# Patient Record
Sex: FEMALE | Race: WHITE | NOT HISPANIC OR LATINO
[De-identification: names, ages, dates, MRNs, and addresses within clinical notes are randomized per-mention and may not be internally consistent; named-entity substitution may affect disease eponyms.]

---

## 2021-09-16 PROBLEM — Z00.00 ENCOUNTER FOR PREVENTIVE HEALTH EXAMINATION: Status: ACTIVE | Noted: 2021-09-16

## 2021-10-15 ENCOUNTER — APPOINTMENT (OUTPATIENT)
Dept: NEUROSURGERY | Facility: CLINIC | Age: 56
End: 2021-10-15

## 2021-11-18 ENCOUNTER — APPOINTMENT (OUTPATIENT)
Dept: NEUROSURGERY | Facility: CLINIC | Age: 56
End: 2021-11-18
Payer: COMMERCIAL

## 2021-11-18 VITALS
TEMPERATURE: 98.1 F | HEART RATE: 66 BPM | OXYGEN SATURATION: 99 % | BODY MASS INDEX: 34.36 KG/M2 | WEIGHT: 182 LBS | HEIGHT: 61 IN | SYSTOLIC BLOOD PRESSURE: 133 MMHG | DIASTOLIC BLOOD PRESSURE: 70 MMHG

## 2021-11-18 DIAGNOSIS — M54.2 CERVICALGIA: ICD-10-CM

## 2021-11-18 PROCEDURE — 99203 OFFICE O/P NEW LOW 30 MIN: CPT

## 2021-11-19 PROBLEM — M54.2 PAIN IN NECK: Status: ACTIVE | Noted: 2021-11-19

## 2021-11-19 NOTE — PHYSICAL EXAM
[Spurling's - Opposite Side] : Positive Spurling's on opposite side [Spurling's Same Side] : Positive Spurling's on same side [Straight-Leg Raise Test - Left] : straight leg raise of the left leg was positive [Straight-Leg Raise Test - Right] : straight leg raise of the right leg was positive [4] : 4/5 Great Toe Extension (L5) [No Deficits] : no sensory deficits [5] : 5/5 Ankle Plantar Flexion (S1)

## 2021-11-19 NOTE — HISTORY OF PRESENT ILLNESS
[> 3 months] : more  than 3 months [de-identified] : She comes in complaining of neck pain for about a year. The severity is about 5 out of 10. The pain fluctuates. She never has a pain free day. The pain radiates into the arms and  hands. She has numbness and tingling. She has balance problems. No dexterity problems. She has had PT with no help and has not done injections. \par Low back pain is 9/10. The pain goes down the legs right worse. The pain is constant, pulls and feels like pressure. SHe has had 3 spinal injections and PT with no help. She also has tingling, numbness and cramps.  She can only walk a block and a half. She can stand about 1/2 hour..

## 2021-11-19 NOTE — PLAN
[FreeTextEntry1] : Her lower low back pain is worse. I will have her do epidural injections before discussing surgery. I will also have her do Xrays with flexion extension views.

## 2021-12-30 ENCOUNTER — TRANSCRIPTION ENCOUNTER (OUTPATIENT)
Age: 56
End: 2021-12-30

## 2022-04-02 ENCOUNTER — TRANSCRIPTION ENCOUNTER (OUTPATIENT)
Age: 57
End: 2022-04-02

## 2022-11-21 ENCOUNTER — NON-APPOINTMENT (OUTPATIENT)
Age: 57
End: 2022-11-21

## 2022-11-21 ENCOUNTER — APPOINTMENT (OUTPATIENT)
Dept: NEUROSURGERY | Facility: CLINIC | Age: 57
End: 2022-11-21

## 2022-11-21 VITALS
BODY MASS INDEX: 33.99 KG/M2 | HEART RATE: 74 BPM | WEIGHT: 180 LBS | RESPIRATION RATE: 17 BRPM | TEMPERATURE: 96.9 F | DIASTOLIC BLOOD PRESSURE: 90 MMHG | HEIGHT: 61 IN | SYSTOLIC BLOOD PRESSURE: 134 MMHG | OXYGEN SATURATION: 96 %

## 2022-11-21 PROCEDURE — 99215 OFFICE O/P EST HI 40 MIN: CPT

## 2022-11-23 NOTE — PHYSICAL EXAM
[General Appearance - Alert] : alert [General Appearance - In No Acute Distress] : in no acute distress [Oriented To Time, Place, And Person] : oriented to person, place, and time [Straight-Leg Raise Test - Right] : straight leg raise of the right leg was positive [Sclera] : the sclera and conjunctiva were normal [Outer Ear] : the ears and nose were normal in appearance [Neck Appearance] : the appearance of the neck was normal [] : no respiratory distress [Skin Color & Pigmentation] : normal skin color and pigmentation [FreeTextEntry6] : RLE 3/5 strength \par RUE 5/5\par LUE 5/5\par LLE 5/5

## 2022-11-23 NOTE — ASSESSMENT
[FreeTextEntry1] : MRI lumbar spine from 10/22/21 reviewed by Dr. Penny with patient demonstrates degenerative arthritis of lumbar spine with foraminal stenosis at L4-5.\par Will need updated imaging prior to finalizing treatment plan.\par Given failure of conservative measures, recommend MRI lumbar spine and flex/ext x-rays. After imaging complete, return to the office to reviewed imaging and finalize treatment plan.\par She was recommended TLSO back brace while ambulating - order provided.\par \par Patient verbalizes agreement and understanding with plan of care.\par \par I, Dr. Penny, personally performed the evaluation and management (E/M) services for this new patient.  That E/M includes conducting the initial examination, assessing all conditions, and establishing the plan of care.  Today, my ACP, Bethanie Lopes, was here to observe my evaluation and management services for this patient to be followed going forward.\par \par

## 2022-11-23 NOTE — HISTORY OF PRESENT ILLNESS
[de-identified] : 57 year old woman with past medical history DM, HTN who presents today for neurosurgical evaluation of cervical and lumbar spine.\par \par Ms. Ramon states that she has suffered from chronic back pain for several years. She states her symptoms have progressed within the past year. She describes low back pain radiating to RLE with intermittent numbness/tingling. Pain/numbness/tingling involves the entire leg to the foot. She also reports RLE weakness. She has done physical therapy for several years which initially helped with symptoms but now is ineffective. She also reports several injections by pain management over the past year which have been ineffective. 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 She can only walk several city blocks before sitting down to rest due to pain.\par \par She also reports neck pain radiating to bilateral upper extremities. Reports bilateral hand and arm weakness.\par \par She brings MRI lumbar and MRI cervical spine from October 2021 for review.\par \par At present, she states her low back pain and leg pain is greatly affecting her quality of life. \par She states she has seen surgeons in the past for her back and has been recommended surgery but refused.

## 2022-12-12 ENCOUNTER — APPOINTMENT (OUTPATIENT)
Dept: MRI IMAGING | Facility: CLINIC | Age: 57
End: 2022-12-12

## 2022-12-12 PROCEDURE — 72148 MRI LUMBAR SPINE W/O DYE: CPT

## 2023-01-23 ENCOUNTER — NON-APPOINTMENT (OUTPATIENT)
Age: 58
End: 2023-01-23

## 2023-01-23 ENCOUNTER — APPOINTMENT (OUTPATIENT)
Dept: NEUROSURGERY | Facility: CLINIC | Age: 58
End: 2023-01-23
Payer: COMMERCIAL

## 2023-01-23 VITALS
BODY MASS INDEX: 33.99 KG/M2 | HEART RATE: 79 BPM | RESPIRATION RATE: 18 BRPM | HEIGHT: 61 IN | TEMPERATURE: 98 F | WEIGHT: 180 LBS | SYSTOLIC BLOOD PRESSURE: 115 MMHG | OXYGEN SATURATION: 98 % | DIASTOLIC BLOOD PRESSURE: 78 MMHG

## 2023-01-23 PROCEDURE — 99214 OFFICE O/P EST MOD 30 MIN: CPT

## 2023-01-29 NOTE — DATA REVIEWED
[de-identified] : \par  MR Lumbar Spine No Cont             Final\par \par No Documents Attached\par \par \par \par \par   EXAM: [id number] - MR SPINE LUMBAR  - ORDERED BY: NIKITA JONES\par \par \par PROCEDURE DATE:  12/12/2022\par \par \par \par INTERPRETATION:  CLINICAL INFORMATION: Lumbar radiculopathy.\par \par ADDITIONAL CLINICAL INFORMATION: Other Condition see Clinical Info\par \par TECHNIQUE: Multiplanar, multisequence MRI was performed of the lumbar spine.\par IV Contrast: NONE\par \par PRIOR STUDIES: No priors available for comparison.\par \par FINDINGS: For the purposes of counting transitional lumbosacral junction is designated sacralized L6. Rudimentary L6/S1 disc.\par \par LOCALIZER: No additional findings.\par BONES: There is no fracture or bone marrow edema.\par ALIGNMENT: Grade I anterolisthesis of L4 and L5.\par SACROILIAC JOINTS/SACRUM: There is no sacral fracture. The SI joints are partially visualized but are intact.\par CONUS AND CAUDA EQUINA: The distal cord and conus are normal in signal. Conus terminates at L2.\par VISUALIZED INTRAPELVIC/INTRA-ABDOMINAL SOFT TISSUES: Normal.\par PARASPINAL SOFT TISSUES: Normal.\par \par \par INDIVIDUAL LEVELS:\par \par LOWER THORACIC SPINE: No spinal canal or neuroforaminal stenosis.\par \par L1-L2: No spinal canal or neuroforaminal stenosis.\par L2-L3: No spinal canal or neuroforaminal stenosis.\par L3-L4: Moderate bilateral facet arthrosis. No spinal canal or neuroforaminal stenosis.\par L4-L5: Grade I anterolisthesis with disc uncovering. Diffuse disc bulge which flattens the ventral thecal sac and narrows the lateral recesses. Severe bilateral facet arthrosis. Ligamentum flavum thickening. Combination results in mild spinal canal stenosis. Moderate bilateral foraminal narrowing.\par L5-L6: Mild disc bulge which mildly flattens the ventral thecal sac. Mild bilateral facet arthrosis. No spinal canal stenosis. Minimal bilateral foraminal narrowing.\par \par \par IMPRESSION:\par \par 1.  Mild multilevel lumbar spondylosis in the setting of transitional lumbosacral junction designated sacralized L6. Rudimentary L6/S1 disc. Thoracic spine radiographs may be performed for definitive counting.\par 2.  Mild spinal canal stenosis and moderate bilateral foraminal narrowing at L4-L5 where there is also grade I anterolisthesis and severe bilateral facet arthrosis.\par \par --- End of Report ---\par \par \par \par \par \par \par EVA RAYMOND MD; Attending Radiologist\par This document has been electronically signed. Dec 14 2022 12:01AM\par \par  \par \par  Ordered by: NIKITA JONES       Collected/Examined: [id number] 10:31AM       \par Verified by: NIKITA JONES 03Jan2023 03:57PM       \par  Result Communication: No patient communication needed at this time;\par Stage: Final       \par  Performed at: [facility]       Resulted: [id number] 11:52PM       Last Updated: 03Jan2023 03:57PM       Accession: [id number]

## 2023-01-29 NOTE — HISTORY OF PRESENT ILLNESS
[de-identified] : 57 year old woman with past medical history DM, HTN who presents today for neurosurgical evaluation of cervical and lumbar spine.\par \par Ms. Ramon states that she has suffered from chronic back pain for several years. She states her symptoms have progressed within the past year. She describes low back pain radiating to RLE with intermittent numbness/tingling. Pain/numbness/tingling involves the entire leg to the foot. She also reports RLE weakness. She has done physical therapy for several years which initially helped with symptoms but now is ineffective. She also reports several injections by pain management over the past year which have been ineffective. 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 She can only walk several city blocks before sitting down to rest due to pain.\par \par She also reports neck pain radiating to bilateral upper extremities. Reports bilateral hand and arm weakness.\par \par She brings MRI lumbar and MRI cervical spine from October 2021 for review.\par \par At present, she states her low back pain and leg pain is greatly affecting her quality of life. \par She states she has seen surgeons in the past for her back and has been recommended surgery but refused.\par \par She returns to the office today to review MRI done 12/12/22. Continues to report severe radicular pain radiating to RIGHT leg. She has completed physical therapy with no improvement in symptoms and has had 2 ESIs with no relief.

## 2023-01-29 NOTE — ASSESSMENT
[FreeTextEntry1] : MRI lumbar spine without contrast done on 12/20/22 reviewed by Dr. Penny with patient demonstrates foraminal narrowing at L4-5 as well as anterolisthesis. \par She has failed conservative management over the last year and now presents with progressive symptoms.\par We discussed surgical intervention, L4-5, L5-S1 laminectomy and fusion.\par Discussed goal of surgery is to HALT progression of symptoms.\par Risks, benefits and alternatives to surgery discussed.\par Risks include but is not limited to infection, no resolution of symptoms/device failure\par Prior to finalizing surgical plan, a CT lumbar spine is necessary for further evaluation of spondylolisthesis and bony prominences\par Also recommend EMG of lower extremities prior to surgery - referred to Dr. Ascencion Lopes\par After EMG and CT lumbar spine complete, RTO to review results and finalize surgical plan. \par \par Patient verbalizes agreement and understanding with plan of care.\par \par I, Dr. Penny, personally performed the evaluation and management (E/M) services for this established patient who presents today with (a) new problem(s)/exacerbation of (an) existing condition(s).  That E/M includes conducting the examination, assessing all new/exacerbated conditions, and establishing a new plan of care.  Today, my ACP, Bethanie Lopes, was here to observe my evaluation and management services for this new problem/exacerbated condition to be followed going forward.\par \par \par \par \par

## 2023-03-20 ENCOUNTER — APPOINTMENT (OUTPATIENT)
Dept: NEUROSURGERY | Facility: CLINIC | Age: 58
End: 2023-03-20
Payer: COMMERCIAL

## 2023-03-20 VITALS
RESPIRATION RATE: 17 BRPM | SYSTOLIC BLOOD PRESSURE: 131 MMHG | HEIGHT: 62 IN | TEMPERATURE: 97.3 F | BODY MASS INDEX: 29.44 KG/M2 | OXYGEN SATURATION: 99 % | WEIGHT: 160 LBS | HEART RATE: 87 BPM | DIASTOLIC BLOOD PRESSURE: 89 MMHG

## 2023-03-20 DIAGNOSIS — Z01.818 ENCOUNTER FOR OTHER PREPROCEDURAL EXAMINATION: ICD-10-CM

## 2023-03-20 PROCEDURE — 99215 OFFICE O/P EST HI 40 MIN: CPT

## 2023-03-22 NOTE — ADDENDUM
[FreeTextEntry1] : Patient has back pain and radicular leg pain bilaterally. MRI and CT lumbar shows grade I spondylolisthesis with bilateral foraminal stenosis and lateral recess stenosis at L4-5 (transitional S1with smalll disc between S1-S2). Likely has some instability at L4-5 resulting in mechanical back pain. She has tried and failed conservative measures including multiple epidural steroid injections, PT, watchful waiting, exercise, weight loss. Options for management discussed including continued conservative. Patient favors surgery. Risks of surgery including CSF leak, paralysis, chronic pain, failure to improve clinically, adjacent segment stenosis/instability, need for reoperation, infection discussed. Patient understands and wishes to proceed. Plan for L4-5 laminectomy/facertectomy/foraminotomy and instrumented posterior-lateral fusion.\par \par Kai Penny

## 2023-03-22 NOTE — DATA REVIEWED
[de-identified] : EXAM: CT LUMBAR SPINE WITHOUT CONTRAST\par \par Note - This patient has received 1 CT studies and 0 Myocardial Perfusion studies within our network over the previous 12 month period.\par \par HISTORY:  Spondylosis with radiculopathy\par \par TECHNIQUE: Multiple axial images are obtained through the lumbar spine. Sagittal and coronal reconstructions are also performed.\par \par CT was performed without contrast with axial multislice multidetector technique. Sagittal, coronal and axial reformatted images were then obtained. One or more of the following dose reduction techniques were used: automated exposure control, adjustment of the mA and/or kV according to patient size, use of iterative reconstruction technique. \par \par COMPARISON: MRI dated 10/22/2021\par \par FINDINGS:\par For the sake of numbering, the most inferior intact intervertebral disc will be considered the S1-2 disc space. This is based on the last visualized rib at T12 and 5 complete lumbar vertebral bodies identified. This is not consistent with the numbering system described on the prior MRI, which considers the last visualized disc space as L5-S1.\par \par A stable grade 1 anterospondylolisthesis is again identified at the L4-5 level measuring 0.6 cm.\par \par There is no fracture.\par \par The vertebral bodies are normal in height and demonstrate normal mineralization.\par \par The intervertebral disc spaces are preserved.\par \par The visualized conus medullaris and nerve roots are unremarkable.\par \par L1-2: The disc is normally aligned and there is no central canal or neuroforaminal stenosis.\par \par L2-3: The disc is normally aligned and there is no central canal or neuroforaminal stenosis.\par \par L3-4: The disc is normally aligned and there is no central canal or neuroforaminal stenosis. Facet hypertrophy is present.\par \par L4-5: There is uncovering of a disc bulge and facet hypertrophy causing moderate to severe bilateral neuroforaminal and lateral recess stenosis. Encroachment on the nerve roots in the bilateral lateral recesses is suspected. There is no significant central canal stenosis.\par \par L5-S1: There is a mild broad-based posterior disc bulge without central canal or neuroforaminal stenosis. A previously present central posterior annular tear is not visualized on the current exam, likely due to technique.\par \par The paraspinal soft tissues are unremarkable.\par \par IMPRESSION:\par 1. Stable grade 1 anterospondylolisthesis at the L4-5 level. On the prior MRI this level was described as the L3-4 level.\par 2. L4-5: Moderate to severe bilateral neuroforaminal and lateral recess stenosis. Encroachment on the nerve roots in the bilateral lateral recesses is suspected.\par \par Thank you for the opportunity to participate in the care of this patient.  \par  \par MAXIMUS HILL MD  - Electronically Signed: 02- 10:00 AM \par Physician to Physician Direct Line is: [phone] [de-identified] : EXAM: [id number] - MR SPINE LUMBAR - ORDERED BY: NIKITA JONES\par \par \par PROCEDURE DATE: 12/12/2022\par \par \par \par INTERPRETATION: CLINICAL INFORMATION: Lumbar radiculopathy.\par \par ADDITIONAL CLINICAL INFORMATION: Other Condition see Clinical Info\par \par TECHNIQUE: Multiplanar, multisequence MRI was performed of the lumbar spine.\par IV Contrast: NONE\par \par PRIOR STUDIES: No priors available for comparison.\par \par FINDINGS: For the purposes of counting transitional lumbosacral junction is designated sacralized L6. Rudimentary L6/S1 disc.\par \par LOCALIZER: No additional findings.\par BONES: There is no fracture or bone marrow edema.\par ALIGNMENT: Grade I anterolisthesis of L4 and L5.\par SACROILIAC JOINTS/SACRUM: There is no sacral fracture. The SI joints are partially visualized but are intact.\par CONUS AND CAUDA EQUINA: The distal cord and conus are normal in signal. Conus terminates at L2.\par VISUALIZED INTRAPELVIC/INTRA-ABDOMINAL SOFT TISSUES: Normal.\par PARASPINAL SOFT TISSUES: Normal.\par \par \par INDIVIDUAL LEVELS:\par \par LOWER THORACIC SPINE: No spinal canal or neuroforaminal stenosis.\par \par L1-L2: No spinal canal or neuroforaminal stenosis.\par L2-L3: No spinal canal or neuroforaminal stenosis.\par L3-L4: Moderate bilateral facet arthrosis. No spinal canal or neuroforaminal stenosis.\par L4-L5: Grade I anterolisthesis with disc uncovering. Diffuse disc bulge which flattens the ventral thecal sac and narrows the lateral recesses. Severe bilateral facet arthrosis. Ligamentum flavum thickening. Combination results in mild spinal canal stenosis. Moderate bilateral foraminal narrowing.\par L5-L6: Mild disc bulge which mildly flattens the ventral thecal sac. Mild bilateral facet arthrosis. No spinal canal stenosis. Minimal bilateral foraminal narrowing.\par \par \par IMPRESSION:\par \par 1. Mild multilevel lumbar spondylosis in the setting of transitional lumbosacral junction designated sacralized L6. Rudimentary L6/S1 disc. Thoracic spine radiographs may be performed for definitive counting.\par 2. Mild spinal canal stenosis and moderate bilateral foraminal narrowing at L4-L5 where there is also grade I anterolisthesis and severe bilateral facet arthrosis.\par \par --- End of Report ---\par \par \par \par \par \par \par EVA RAYMOND MD; Attending Radiologist\par This document has been electronically signed. Dec 14 2022 12:01AM

## 2023-03-22 NOTE — ASSESSMENT
[FreeTextEntry1] : Surgical intervention is recommended (L4-5 laminectomy and fusion). Risks, benefits and alternative to the surgery was discussed with the patient. She verbalizes understanding and would like to proceed the surgery.\par \par PLAN\par -tentative surgery date on 4/12/2023\par - medical/cardiac clearance\par - COVID PCR test 3-5 days before surgery

## 2023-03-22 NOTE — HISTORY OF PRESENT ILLNESS
[FreeTextEntry1] : severe L4-S1 b/l foraminal stenosis with L4-5 spondylolisthesis who returns today to review CT L-spine. [de-identified] : 57 year old woman with past medical history DM, HTN who presents today for neurosurgical evaluation of cervical and lumbar spine.\par \par Ms. Ramon states that she has suffered from chronic back pain for several years. She states her symptoms have progressed within the past year. She describes low back pain radiating to RLE with intermittent numbness/tingling. Pain/numbness/tingling involves the entire leg to the foot. She also reports RLE weakness. She has done physical therapy for several years which initially helped with symptoms but now is ineffective. She also reports several injections by pain management over the past year which have been ineffective. 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 She can only walk several city blocks before sitting down to rest due to pain.\par \par She also reports neck pain radiating to bilateral upper extremities. Reports bilateral hand and arm weakness.\par \par She brings MRI lumbar and MRI cervical spine from October 2021 for review.\par \par At present, she states her low back pain and leg pain is greatly affecting her quality of life. \par She states she has seen surgeons in the past for her back and has been recommended surgery but refused.\par \par 1/23/2023 VISIT\par She returns to the office today to review MRI done 12/12/22. Continues to report severe radicular pain radiating to RIGHT leg. She has completed physical therapy with no improvement in symptoms and has had 2 ESIs with no relief.

## 2023-04-04 ENCOUNTER — TRANSCRIPTION ENCOUNTER (OUTPATIENT)
Age: 58
End: 2023-04-04

## 2023-04-08 LAB — SARS-COV-2 N GENE NPH QL NAA+PROBE: NOT DETECTED

## 2023-04-11 ENCOUNTER — TRANSCRIPTION ENCOUNTER (OUTPATIENT)
Age: 58
End: 2023-04-11

## 2023-04-12 ENCOUNTER — INPATIENT (INPATIENT)
Facility: HOSPITAL | Age: 58
LOS: 5 days | Discharge: NURSING, REHAB OR LONG-TERM CARE | DRG: 460 | End: 2023-04-18
Attending: NEUROLOGICAL SURGERY | Admitting: NEUROLOGICAL SURGERY
Payer: COMMERCIAL

## 2023-04-12 ENCOUNTER — APPOINTMENT (OUTPATIENT)
Dept: NEUROSURGERY | Facility: HOSPITAL | Age: 58
End: 2023-04-12

## 2023-04-12 ENCOUNTER — TRANSCRIPTION ENCOUNTER (OUTPATIENT)
Age: 58
End: 2023-04-12

## 2023-04-12 VITALS
DIASTOLIC BLOOD PRESSURE: 83 MMHG | RESPIRATION RATE: 16 BRPM | HEART RATE: 73 BPM | WEIGHT: 157.85 LBS | OXYGEN SATURATION: 100 % | TEMPERATURE: 97 F | HEIGHT: 61 IN | SYSTOLIC BLOOD PRESSURE: 146 MMHG

## 2023-04-12 DIAGNOSIS — Z98.891 HISTORY OF UTERINE SCAR FROM PREVIOUS SURGERY: Chronic | ICD-10-CM

## 2023-04-12 DIAGNOSIS — Z98.890 OTHER SPECIFIED POSTPROCEDURAL STATES: Chronic | ICD-10-CM

## 2023-04-12 LAB
ANION GAP SERPL CALC-SCNC: 6 MMOL/L — SIGNIFICANT CHANGE UP (ref 5–17)
BLD GP AB SCN SERPL QL: NEGATIVE — SIGNIFICANT CHANGE UP
BUN SERPL-MCNC: 8 MG/DL — SIGNIFICANT CHANGE UP (ref 7–23)
CALCIUM SERPL-MCNC: 8.6 MG/DL — SIGNIFICANT CHANGE UP (ref 8.4–10.5)
CHLORIDE SERPL-SCNC: 108 MMOL/L — SIGNIFICANT CHANGE UP (ref 96–108)
CO2 SERPL-SCNC: 25 MMOL/L — SIGNIFICANT CHANGE UP (ref 22–31)
CREAT SERPL-MCNC: 0.52 MG/DL — SIGNIFICANT CHANGE UP (ref 0.5–1.3)
EGFR: 110 ML/MIN/1.73M2 — SIGNIFICANT CHANGE UP
GLUCOSE SERPL-MCNC: 144 MG/DL — HIGH (ref 70–99)
HCT VFR BLD CALC: 32.2 % — LOW (ref 34.5–45)
HGB BLD-MCNC: 10.4 G/DL — LOW (ref 11.5–15.5)
MCHC RBC-ENTMCNC: 26.1 PG — LOW (ref 27–34)
MCHC RBC-ENTMCNC: 32.3 GM/DL — SIGNIFICANT CHANGE UP (ref 32–36)
MCV RBC AUTO: 80.9 FL — SIGNIFICANT CHANGE UP (ref 80–100)
NRBC # BLD: 0 /100 WBCS — SIGNIFICANT CHANGE UP (ref 0–0)
PLATELET # BLD AUTO: 302 K/UL — SIGNIFICANT CHANGE UP (ref 150–400)
POTASSIUM SERPL-MCNC: 3.7 MMOL/L — SIGNIFICANT CHANGE UP (ref 3.5–5.3)
POTASSIUM SERPL-SCNC: 3.7 MMOL/L — SIGNIFICANT CHANGE UP (ref 3.5–5.3)
RBC # BLD: 3.98 M/UL — SIGNIFICANT CHANGE UP (ref 3.8–5.2)
RBC # FLD: 15.3 % — HIGH (ref 10.3–14.5)
RH IG SCN BLD-IMP: POSITIVE — SIGNIFICANT CHANGE UP
SODIUM SERPL-SCNC: 139 MMOL/L — SIGNIFICANT CHANGE UP (ref 135–145)
WBC # BLD: 7.04 K/UL — SIGNIFICANT CHANGE UP (ref 3.8–10.5)
WBC # FLD AUTO: 7.04 K/UL — SIGNIFICANT CHANGE UP (ref 3.8–10.5)

## 2023-04-12 PROCEDURE — 22840 INSERT SPINE FIXATION DEVICE: CPT

## 2023-04-12 PROCEDURE — 63047 LAM FACETEC & FORAMOT LUMBAR: CPT

## 2023-04-12 PROCEDURE — 61783 SCAN PROC SPINAL: CPT | Mod: 59

## 2023-04-12 PROCEDURE — 22612 ARTHRD PST TQ 1NTRSPC LUMBAR: CPT

## 2023-04-12 DEVICE — SURGIFOAM PAD 8CM X 12.5CM X 10MM (100): Type: IMPLANTABLE DEVICE | Status: FUNCTIONAL

## 2023-04-12 DEVICE — SCREW BLOCKER BONE XIA: Type: IMPLANTABLE DEVICE | Status: FUNCTIONAL

## 2023-04-12 DEVICE — SCREW POLY 6.5X50MM: Type: IMPLANTABLE DEVICE | Status: FUNCTIONAL

## 2023-04-12 DEVICE — SCREW SERRATO POLY 6.5X40MM: Type: IMPLANTABLE DEVICE | Status: FUNCTIONAL

## 2023-04-12 DEVICE — GRAFT BONE INFUSE KIT XS: Type: IMPLANTABLE DEVICE | Status: FUNCTIONAL

## 2023-04-12 DEVICE — SURGIFLO HEMOSTATIC MATRIX KIT: Type: IMPLANTABLE DEVICE | Status: FUNCTIONAL

## 2023-04-12 DEVICE — GRAFT BONE VITOSS BIMODAL 5CC: Type: IMPLANTABLE DEVICE | Status: FUNCTIONAL

## 2023-04-12 DEVICE — ROD RAD 45: Type: IMPLANTABLE DEVICE | Status: FUNCTIONAL

## 2023-04-12 RX ORDER — METOPROLOL TARTRATE 50 MG
200 TABLET ORAL ONCE
Refills: 0 | Status: DISCONTINUED | OUTPATIENT
Start: 2023-04-12 | End: 2023-04-12

## 2023-04-12 RX ORDER — INSULIN LISPRO 100/ML
VIAL (ML) SUBCUTANEOUS AT BEDTIME
Refills: 0 | Status: DISCONTINUED | OUTPATIENT
Start: 2023-04-12 | End: 2023-04-13

## 2023-04-12 RX ORDER — HYDROMORPHONE HYDROCHLORIDE 2 MG/ML
0.5 INJECTION INTRAMUSCULAR; INTRAVENOUS; SUBCUTANEOUS ONCE
Refills: 0 | Status: DISCONTINUED | OUTPATIENT
Start: 2023-04-12 | End: 2023-04-12

## 2023-04-12 RX ORDER — DEXTROSE 50 % IN WATER 50 %
25 SYRINGE (ML) INTRAVENOUS ONCE
Refills: 0 | Status: DISCONTINUED | OUTPATIENT
Start: 2023-04-12 | End: 2023-04-13

## 2023-04-12 RX ORDER — ACETAMINOPHEN 500 MG
1000 TABLET ORAL EVERY 8 HOURS
Refills: 0 | Status: DISCONTINUED | OUTPATIENT
Start: 2023-04-12 | End: 2023-04-13

## 2023-04-12 RX ORDER — LOSARTAN POTASSIUM 100 MG/1
100 TABLET, FILM COATED ORAL ONCE
Refills: 0 | Status: DISCONTINUED | OUTPATIENT
Start: 2023-04-12 | End: 2023-04-12

## 2023-04-12 RX ORDER — ACETAMINOPHEN 500 MG
1000 TABLET ORAL ONCE
Refills: 0 | Status: DISCONTINUED | OUTPATIENT
Start: 2023-04-12 | End: 2023-04-12

## 2023-04-12 RX ORDER — GLUCAGON INJECTION, SOLUTION 0.5 MG/.1ML
1 INJECTION, SOLUTION SUBCUTANEOUS ONCE
Refills: 0 | Status: DISCONTINUED | OUTPATIENT
Start: 2023-04-12 | End: 2023-04-16

## 2023-04-12 RX ORDER — AMLODIPINE BESYLATE 2.5 MG/1
5 TABLET ORAL DAILY
Refills: 0 | Status: DISCONTINUED | OUTPATIENT
Start: 2023-04-12 | End: 2023-04-18

## 2023-04-12 RX ORDER — INSULIN LISPRO 100/ML
VIAL (ML) SUBCUTANEOUS ONCE
Refills: 0 | Status: DISCONTINUED | OUTPATIENT
Start: 2023-04-12 | End: 2023-04-12

## 2023-04-12 RX ORDER — CHLORHEXIDINE GLUCONATE 213 G/1000ML
1 SOLUTION TOPICAL ONCE
Refills: 0 | Status: COMPLETED | OUTPATIENT
Start: 2023-04-12 | End: 2023-04-12

## 2023-04-12 RX ORDER — SODIUM CHLORIDE 9 MG/ML
1000 INJECTION, SOLUTION INTRAVENOUS
Refills: 0 | Status: DISCONTINUED | OUTPATIENT
Start: 2023-04-12 | End: 2023-04-13

## 2023-04-12 RX ORDER — SODIUM CHLORIDE 9 MG/ML
1000 INJECTION INTRAMUSCULAR; INTRAVENOUS; SUBCUTANEOUS
Refills: 0 | Status: DISCONTINUED | OUTPATIENT
Start: 2023-04-12 | End: 2023-04-13

## 2023-04-12 RX ORDER — POLYETHYLENE GLYCOL 3350 17 G/17G
17 POWDER, FOR SOLUTION ORAL DAILY
Refills: 0 | Status: DISCONTINUED | OUTPATIENT
Start: 2023-04-12 | End: 2023-04-18

## 2023-04-12 RX ORDER — DEXTROSE 50 % IN WATER 50 %
12.5 SYRINGE (ML) INTRAVENOUS ONCE
Refills: 0 | Status: DISCONTINUED | OUTPATIENT
Start: 2023-04-12 | End: 2023-04-12

## 2023-04-12 RX ORDER — CEFAZOLIN SODIUM 1 G
2000 VIAL (EA) INJECTION EVERY 8 HOURS
Refills: 0 | Status: COMPLETED | OUTPATIENT
Start: 2023-04-12 | End: 2023-04-13

## 2023-04-12 RX ORDER — CELECOXIB 200 MG/1
200 CAPSULE ORAL ONCE
Refills: 0 | Status: COMPLETED | OUTPATIENT
Start: 2023-04-12 | End: 2023-04-12

## 2023-04-12 RX ORDER — METHOCARBAMOL 500 MG/1
500 TABLET, FILM COATED ORAL ONCE
Refills: 0 | Status: DISCONTINUED | OUTPATIENT
Start: 2023-04-12 | End: 2023-04-12

## 2023-04-12 RX ORDER — SODIUM CHLORIDE 9 MG/ML
1000 INJECTION, SOLUTION INTRAVENOUS
Refills: 0 | Status: DISCONTINUED | OUTPATIENT
Start: 2023-04-12 | End: 2023-04-12

## 2023-04-12 RX ORDER — OXYCODONE HYDROCHLORIDE 5 MG/1
5 TABLET ORAL ONCE
Refills: 0 | Status: DISCONTINUED | OUTPATIENT
Start: 2023-04-12 | End: 2023-04-12

## 2023-04-12 RX ORDER — APREPITANT 80 MG/1
40 CAPSULE ORAL ONCE
Refills: 0 | Status: COMPLETED | OUTPATIENT
Start: 2023-04-12 | End: 2023-04-12

## 2023-04-12 RX ORDER — GLUCAGON INJECTION, SOLUTION 0.5 MG/.1ML
1 INJECTION, SOLUTION SUBCUTANEOUS ONCE
Refills: 0 | Status: DISCONTINUED | OUTPATIENT
Start: 2023-04-12 | End: 2023-04-13

## 2023-04-12 RX ORDER — ALBUTEROL 90 UG/1
2 AEROSOL, METERED ORAL DAILY
Refills: 0 | Status: DISCONTINUED | OUTPATIENT
Start: 2023-04-12 | End: 2023-04-18

## 2023-04-12 RX ORDER — SENNA PLUS 8.6 MG/1
2 TABLET ORAL AT BEDTIME
Refills: 0 | Status: DISCONTINUED | OUTPATIENT
Start: 2023-04-12 | End: 2023-04-18

## 2023-04-12 RX ORDER — SENNA PLUS 8.6 MG/1
2 TABLET ORAL ONCE
Refills: 0 | Status: DISCONTINUED | OUTPATIENT
Start: 2023-04-12 | End: 2023-04-12

## 2023-04-12 RX ORDER — ACETAMINOPHEN 500 MG
1000 TABLET ORAL ONCE
Refills: 0 | Status: COMPLETED | OUTPATIENT
Start: 2023-04-12 | End: 2023-04-12

## 2023-04-12 RX ORDER — DEXTROSE 50 % IN WATER 50 %
25 SYRINGE (ML) INTRAVENOUS ONCE
Refills: 0 | Status: DISCONTINUED | OUTPATIENT
Start: 2023-04-12 | End: 2023-04-12

## 2023-04-12 RX ORDER — OXYCODONE HYDROCHLORIDE 5 MG/1
5 TABLET ORAL EVERY 4 HOURS
Refills: 0 | Status: DISCONTINUED | OUTPATIENT
Start: 2023-04-12 | End: 2023-04-18

## 2023-04-12 RX ORDER — HYDROMORPHONE HYDROCHLORIDE 2 MG/ML
0.5 INJECTION INTRAMUSCULAR; INTRAVENOUS; SUBCUTANEOUS
Refills: 0 | Status: DISCONTINUED | OUTPATIENT
Start: 2023-04-12 | End: 2023-04-12

## 2023-04-12 RX ORDER — INSULIN LISPRO 100/ML
VIAL (ML) SUBCUTANEOUS
Refills: 0 | Status: DISCONTINUED | OUTPATIENT
Start: 2023-04-12 | End: 2023-04-13

## 2023-04-12 RX ORDER — LOSARTAN POTASSIUM 100 MG/1
100 TABLET, FILM COATED ORAL DAILY
Refills: 0 | Status: DISCONTINUED | OUTPATIENT
Start: 2023-04-12 | End: 2023-04-18

## 2023-04-12 RX ORDER — DEXTROSE 50 % IN WATER 50 %
12.5 SYRINGE (ML) INTRAVENOUS ONCE
Refills: 0 | Status: DISCONTINUED | OUTPATIENT
Start: 2023-04-12 | End: 2023-04-13

## 2023-04-12 RX ORDER — POVIDONE-IODINE 5 %
1 AEROSOL (ML) TOPICAL ONCE
Refills: 0 | Status: COMPLETED | OUTPATIENT
Start: 2023-04-12 | End: 2023-04-12

## 2023-04-12 RX ORDER — DEXTROSE 50 % IN WATER 50 %
15 SYRINGE (ML) INTRAVENOUS ONCE
Refills: 0 | Status: DISCONTINUED | OUTPATIENT
Start: 2023-04-12 | End: 2023-04-13

## 2023-04-12 RX ORDER — METHOCARBAMOL 500 MG/1
500 TABLET, FILM COATED ORAL EVERY 8 HOURS
Refills: 0 | Status: DISCONTINUED | OUTPATIENT
Start: 2023-04-12 | End: 2023-04-13

## 2023-04-12 RX ORDER — METOPROLOL TARTRATE 50 MG
200 TABLET ORAL DAILY
Refills: 0 | Status: DISCONTINUED | OUTPATIENT
Start: 2023-04-12 | End: 2023-04-18

## 2023-04-12 RX ORDER — ONDANSETRON 8 MG/1
4 TABLET, FILM COATED ORAL ONCE
Refills: 0 | Status: DISCONTINUED | OUTPATIENT
Start: 2023-04-12 | End: 2023-04-12

## 2023-04-12 RX ORDER — DEXTROSE 50 % IN WATER 50 %
15 SYRINGE (ML) INTRAVENOUS ONCE
Refills: 0 | Status: DISCONTINUED | OUTPATIENT
Start: 2023-04-12 | End: 2023-04-12

## 2023-04-12 RX ORDER — POLYETHYLENE GLYCOL 3350 17 G/17G
17 POWDER, FOR SOLUTION ORAL ONCE
Refills: 0 | Status: DISCONTINUED | OUTPATIENT
Start: 2023-04-12 | End: 2023-04-12

## 2023-04-12 RX ADMIN — Medication 1000 MILLIGRAM(S): at 06:57

## 2023-04-12 RX ADMIN — HYDROMORPHONE HYDROCHLORIDE 0.5 MILLIGRAM(S): 2 INJECTION INTRAMUSCULAR; INTRAVENOUS; SUBCUTANEOUS at 16:05

## 2023-04-12 RX ADMIN — Medication 50 MILLIGRAM(S): at 21:26

## 2023-04-12 RX ADMIN — HYDROMORPHONE HYDROCHLORIDE 0.5 MILLIGRAM(S): 2 INJECTION INTRAMUSCULAR; INTRAVENOUS; SUBCUTANEOUS at 14:52

## 2023-04-12 RX ADMIN — Medication 100 MILLIGRAM(S): at 21:27

## 2023-04-12 RX ADMIN — OXYCODONE HYDROCHLORIDE 5 MILLIGRAM(S): 5 TABLET ORAL at 21:26

## 2023-04-12 RX ADMIN — SENNA PLUS 2 TABLET(S): 8.6 TABLET ORAL at 21:26

## 2023-04-12 RX ADMIN — APREPITANT 40 MILLIGRAM(S): 80 CAPSULE ORAL at 06:57

## 2023-04-12 RX ADMIN — Medication 1 APPLICATION(S): at 07:22

## 2023-04-12 RX ADMIN — CELECOXIB 200 MILLIGRAM(S): 200 CAPSULE ORAL at 07:10

## 2023-04-12 RX ADMIN — CHLORHEXIDINE GLUCONATE 1 APPLICATION(S): 213 SOLUTION TOPICAL at 07:22

## 2023-04-12 RX ADMIN — OXYCODONE HYDROCHLORIDE 5 MILLIGRAM(S): 5 TABLET ORAL at 17:21

## 2023-04-12 RX ADMIN — HYDROMORPHONE HYDROCHLORIDE 0.5 MILLIGRAM(S): 2 INJECTION INTRAMUSCULAR; INTRAVENOUS; SUBCUTANEOUS at 16:20

## 2023-04-12 RX ADMIN — HYDROMORPHONE HYDROCHLORIDE 0.5 MILLIGRAM(S): 2 INJECTION INTRAMUSCULAR; INTRAVENOUS; SUBCUTANEOUS at 14:37

## 2023-04-12 RX ADMIN — OXYCODONE HYDROCHLORIDE 5 MILLIGRAM(S): 5 TABLET ORAL at 21:56

## 2023-04-12 RX ADMIN — OXYCODONE HYDROCHLORIDE 5 MILLIGRAM(S): 5 TABLET ORAL at 17:51

## 2023-04-12 NOTE — ASU PATIENT PROFILE, ADULT - FALL HARM RISK - RISK INTERVENTIONS

## 2023-04-12 NOTE — PHYSICAL THERAPY INITIAL EVALUATION ADULT - ADDITIONAL COMMENTS
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 pt states children work and are gone from 8-5 daily. pt was independent with ADLs and amb PTA.

## 2023-04-12 NOTE — H&P ADULT - HISTORY OF PRESENT ILLNESS
57F diagnosed with severe L4-S1 b/I foraminal stenosis with L4-5 spondylolisthesis. Past medical history DM, HT who presents today for elective L4-5 fusion. Ms. Ramon states that she has suffered from chronic back pain for several years. She states her symptoms have progressed within the past year. She describes LBP radiating to RLE with intermittent numbness/tingling. Pain/numbness/tingling involves the entire leg to the foot. She also reports RLE weakness. She has done physical therapy for several years which initially helped with symptoms but now is ineffective. She also reports several injections by pain management over the past year which have been ineffective. 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 She can only walk several city blocks before sitting down to rest due to pain. At present, she states her low back pain and leg pain is greatly affecting her quality of life.    ICU Vital Signs Last 24 Hrs  T(C): --  T(F): --  HR: --  BP: --  BP(mean): --  ABP: --  ABP(mean): --  RR: --  SpO2: --     57F diagnosed with severe L4-S1 b/I foraminal stenosis with L4-5 spondylolisthesis. Past medical history DM, HT who presents today for elective L4-5 fusion. Ms. Ramon states that she has suffered from chronic back pain for several years. She states her symptoms have progressed within the past year. She describes LBP radiating to RLE with intermittent numbness/tingling. Pain/numbness/tingling involves the entire leg to the foot. She also reports RLE weakness. She has done physical therapy for several years which initially helped with symptoms but now is ineffective. She also reports several injections by pain management over the past year which have been ineffective. 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 She can only walk several city blocks before sitting down to rest due to pain. At present, she states her low back pain and leg pain is greatly affecting her quality of life.

## 2023-04-12 NOTE — BRIEF OPERATIVE NOTE - NSICDXBRIEFPROCEDURE_GEN_ALL_CORE_FT
PROCEDURES:  Fusion of lumbar spine using posterior oblique technique 12-Apr-2023 06:16:22  Gee Gallegos

## 2023-04-12 NOTE — PRE-ANESTHESIA EVALUATION ADULT - NSANTHPMHFT_GEN_ALL_CORE
METS>4  mild intermittent asthma  on semaglutide for DM and weight loss, recent dose increase with mild nausea  frequent PVCs, trigeminy  cervical radiculopathy

## 2023-04-12 NOTE — PHYSICAL THERAPY INITIAL EVALUATION ADULT - PERTINENT HX OF CURRENT PROBLEM, REHAB EVAL
57F diagnosed with severe L4-S1 b/I foraminal stenosis with L4-5 spondylolisthesis. Past medical history DM, HT who presents today for elective L4-5 fusion. Ms. Ramon states that she has suffered from chronic back pain for several years. She states her symptoms have progressed within the past year. She describes LBP radiating to RLE with intermittent numbness/tingling. Pain/numbness/tingling involves the entire leg to the foot. She also reports RLE weakness. She has done physical therapy for several years which initially helped with symptoms but now is ineffective. She also reports several injections by pain management over the past year which have been ineffective. 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 She can only walk several city blocks before sitting down to rest due to pain. At present, she states her low back pain and leg pain is greatly affecting her quality of life.

## 2023-04-12 NOTE — H&P ADULT - NSHPPHYSICALEXAM_GEN_ALL_CORE
Constitutional: 58 y/o  female awake, alert in no acute distress.  Eyes:  Sclera anicteric, conjunctiva noninjected.  ENMT: Oropharyngeal mucosa moist, pink. Tongue midline.    Respiratory: Clear to auscultation bilaterally.  No rales, rhonchi, wheezes.  Cardiovascular: Regular rate and rhythm.  S1, S2 heard.  Gastrointestinal:  Soft, nontender, nondistended.  +BS.  Genitourinary:  Deferred.  Rectal: Deferred.  Vascular: Extremities warm, no ulcers, no discoloration of skin.   Neurological: Gen: AA&O x 3, conversant, appropriate.      CN II-XII grossly intact.    Motor: DE [place] x 4, 5/5 throughout UE/LE. >>FILL ME    Sens: Sensation intact to light touch throughout.    Hoffmans negative bilaterally.  Plantar downgoing bilaterally.  No clonus.      No pronator drift, no dysmetria.  Skin: Warm, dry, no erythema.

## 2023-04-12 NOTE — H&P ADULT - NSICDXFAMILYHX_GEN_ALL_CORE_FT
FAMILY HISTORY:  Mother  Still living? Unknown  FH ischemic heart disease, Age at diagnosis: Age Unknown

## 2023-04-12 NOTE — H&P ADULT - ASSESSMENT
57F with progressive chronic LBP an RLE raiculopathy likely caused by severe L4-S1 b/I foraminal stenosis with L4-5 spondylolisthesis presenting today for elective L4-5 fusion.   -OR this AM for elective L4-5 posterior laminectomy and instrumented fusion.

## 2023-04-12 NOTE — H&P ADULT - NSICDXPASTMEDICALHX_GEN_ALL_CORE_FT
PAST MEDICAL HISTORY:  Asthma     Chronic sinusitis     HTN (hypertension)     Lumbar radiculopathy     Type 2 diabetes mellitus

## 2023-04-12 NOTE — PROGRESS NOTE ADULT - SUBJECTIVE AND OBJECTIVE BOX
NEUROSURGERY POST OP NOTE:    POD# 0 S/P L4-5 laminectomy with fusion    S: Pt laying in bed complaining of 10/10 back pain. Pt has no other complaints to offer at this time.       T(C): 36.1 (04-12-23 @ 13:56), Max: 36.2 (04-12-23 @ 06:26)  HR: 67 (04-12-23 @ 14:11) (67 - 74)  BP: 94/60 (04-12-23 @ 14:11) (90/55 - 146/83)  RR: 7 (04-12-23 @ 14:11) (7 - 21)  SpO2: 100% (04-12-23 @ 14:11) (100% - 100%)        acetaminophen     Tablet .. 1000 milliGRAM(s) Oral Once  albuterol    90 MICROgram(s) HFA Inhaler 2 Puff(s) Inhalation daily PRN  amLODIPine   Tablet 5 milliGRAM(s) Oral daily  ceFAZolin   IVPB 2000 milliGRAM(s) IV Intermittent every 8 hours  dextrose 5%. 1000 milliLiter(s) IV Continuous <Continuous>  dextrose 5%. 1000 milliLiter(s) IV Continuous <Continuous>  dextrose 50% Injectable 25 Gram(s) IV Push Once  dextrose 50% Injectable 12.5 Gram(s) IV Push Once  dextrose 50% Injectable 25 Gram(s) IV Push Once  dextrose Oral Gel 15 Gram(s) Oral Once PRN  glucagon  Injectable 1 milliGRAM(s) IntraMuscular Once  HYDROmorphone  Injectable 0.5 milliGRAM(s) IV Push every 15 minutes  insulin lispro (ADMELOG) corrective regimen sliding scale   SubCutaneous Once  insulin lispro (ADMELOG) corrective regimen sliding scale   SubCutaneous Once  lactated ringers. 1000 milliLiter(s) IV Continuous <Continuous>  losartan 100 milliGRAM(s) Oral Once  methocarbamol 500 milliGRAM(s) Oral Once  metoprolol succinate  milliGRAM(s) Oral Once  ondansetron   Disintegrating Tablet 4 milliGRAM(s) Oral Once  oxyCODONE    IR 5 milliGRAM(s) Oral Once PRN  polyethylene glycol 3350 17 Gram(s) Oral Once  pregabalin 50 milliGRAM(s) Oral Once  senna 2 Tablet(s) Oral Once      Exam:  General: Pt is laying in hospital bed, in distress 2/2 pain, lethargic 2/2 anesthesia   Cardiovascular: RRR, normal S1 and S2   Respiratory: on 2L NC, non-labored breathing, symmetric chest rise   GI: abd soft, NTND   Neuro: A&O x 3, no aphasia, unable to assess for dysmetria 2/2 pain causing limited effort  Moving B/L UE antigravity, B/L LE withdrawing to noxious stimuli, wiggles toes to command B/L  Wounds: lumbar gauze dressing C/D/I, no evidence of hematoma     WOUND/DRAINS: one deep HMV to full suction draining sanguinous fluid, hunt in place    DEVICES: SCDs for DVT prophylaxis B/L      Assessment:   57 year old female w/ PMHx of DM and chronic back pain found to have severe, B/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 Pt is now s/p L4-5 laminectomy with fusion (4/12/23).       Plan:  Neuro:  - Neuro/vital checks Q1hr.   - Pain management with Tylenol 1g Oxy 5 and Dilaudid 0.5 PRN  - Lyrica 50mg Q 8hrs.     Cardio:  - SBP goal   - Continue home Metoprolol and Amlodipine     Pulm:  - Incentive spirometry   - NC PRN  - Albuterol PRN    GI:  - Consistent carb diet   - ISS  - Bowel regimen    Endo:  - ISS  - Continue home Losartan   - F/U A1c    Renal:  - Hunt- F/U TOV    ID  - Post-op Ancef     Discussed with Dr. Penny    Assessment:  Present when checked    []  GCS  E   V  M     Heart Failure: []Acute, [] acute on chronic , []chronic  Heart Failure:  [] Diastolic (HFpEF), [] Systolic (HFrEF), []Combined (HFpEF and HFrEF), [] RHF, [] Pulm HTN, [] Other    [] DEN, [] ATN, [] AIN, [] other  [] CKD1, [] CKD2, [] CKD 3, [] CKD 4, [] CKD 5, []ESRD    Encephalopathy: [] Metabolic, [] Hepatic, [] toxic, [] Neurological, [] Other    Abnormal Nurtitional Status: [] malnurtition (see nutrition note), [ ]underweight: BMI < 19, [] morbid obesity: BMI >40, [] Cachexia    [] Sepsis  [] hypovolemic shock,[] cardiogenic shock, [] hemorrhagic shock, [] neuogenic shock  [] Acute Respiratory Failure  []Cerebral edema, [] Brain compression/ herniation,   [] Functional quadriplegia  [] Acute blood loss anemia       NEUROSURGERY POST OP NOTE:    POD# 0 S/P L4-5 laminectomy with fusion    S: Pt laying in bed complaining of 10/10 back pain. Pt has no other complaints to offer at this time.       T(C): 36.1 (04-12-23 @ 13:56), Max: 36.2 (04-12-23 @ 06:26)  HR: 67 (04-12-23 @ 14:11) (67 - 74)  BP: 94/60 (04-12-23 @ 14:11) (90/55 - 146/83)  RR: 7 (04-12-23 @ 14:11) (7 - 21)  SpO2: 100% (04-12-23 @ 14:11) (100% - 100%)        acetaminophen     Tablet .. 1000 milliGRAM(s) Oral Once  albuterol    90 MICROgram(s) HFA Inhaler 2 Puff(s) Inhalation daily PRN  amLODIPine   Tablet 5 milliGRAM(s) Oral daily  ceFAZolin   IVPB 2000 milliGRAM(s) IV Intermittent every 8 hours  dextrose 5%. 1000 milliLiter(s) IV Continuous <Continuous>  dextrose 5%. 1000 milliLiter(s) IV Continuous <Continuous>  dextrose 50% Injectable 25 Gram(s) IV Push Once  dextrose 50% Injectable 12.5 Gram(s) IV Push Once  dextrose 50% Injectable 25 Gram(s) IV Push Once  dextrose Oral Gel 15 Gram(s) Oral Once PRN  glucagon  Injectable 1 milliGRAM(s) IntraMuscular Once  HYDROmorphone  Injectable 0.5 milliGRAM(s) IV Push every 15 minutes  insulin lispro (ADMELOG) corrective regimen sliding scale   SubCutaneous Once  insulin lispro (ADMELOG) corrective regimen sliding scale   SubCutaneous Once  lactated ringers. 1000 milliLiter(s) IV Continuous <Continuous>  losartan 100 milliGRAM(s) Oral Once  methocarbamol 500 milliGRAM(s) Oral Once  metoprolol succinate  milliGRAM(s) Oral Once  ondansetron   Disintegrating Tablet 4 milliGRAM(s) Oral Once  oxyCODONE    IR 5 milliGRAM(s) Oral Once PRN  polyethylene glycol 3350 17 Gram(s) Oral Once  pregabalin 50 milliGRAM(s) Oral Once  senna 2 Tablet(s) Oral Once      Exam:  General: Pt is laying in hospital bed, in distress 2/2 pain, lethargic 2/2 anesthesia   Cardiovascular: RRR, normal S1 and S2   Respiratory: on 2L NC, non-labored breathing, symmetric chest rise   GI: abd soft, NTND   Neuro: A&O x 3, no aphasia, unable to assess for dysmetria 2/2 pain causing limited effort  Moving B/L UE antigravity, B/L LE withdrawing to noxious stimuli, wiggles toes to command B/L  Wounds: lumbar gauze dressing C/D/I, no evidence of hematoma     WOUND/DRAINS: one deep HMV to full suction draining sanguinous fluid, hunt in place    DEVICES: SCDs for DVT prophylaxis B/L      Assessment:   57 year old female w/ PMHx of DM and chronic back pain found to have severe, B/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 Pt is now s/p L4-5 laminectomy with fusion (4/12/23).       Plan:  Neuro:  - Neuro/vital checks Q1hr.   - Pain management with Tylenol 1g Oxy 5 and Dilaudid 0.5 PRN  - Lyrica 50mg Q 8hrs.     Cardio:  - SBP goal   - Continue home Metoprolol and Amlodipine     Pulm:  - Incentive spirometry   - NC PRN  - Albuterol PRN    GI:  - Consistent carb diet   - ISS  - Bowel regimen    Endo:  - ISS  - Continue home Losartan   - F/U A1c    Renal:  - Hunt- F/U TOV    Heme  - SCDs for DVT prophylaxis     ID  - Post-op Ancef     Discussed with Dr. Penny    Assessment:  Present when checked    []  GCS  E   V  M     Heart Failure: []Acute, [] acute on chronic , []chronic  Heart Failure:  [] Diastolic (HFpEF), [] Systolic (HFrEF), []Combined (HFpEF and HFrEF), [] RHF, [] Pulm HTN, [] Other    [] DEN, [] ATN, [] AIN, [] other  [] CKD1, [] CKD2, [] CKD 3, [] CKD 4, [] CKD 5, []ESRD    Encephalopathy: [] Metabolic, [] Hepatic, [] toxic, [] Neurological, [] Other    Abnormal Nurtitional Status: [] malnurtition (see nutrition note), [ ]underweight: BMI < 19, [] morbid obesity: BMI >40, [] Cachexia    [] Sepsis  [] hypovolemic shock,[] cardiogenic shock, [] hemorrhagic shock, [] neuogenic shock  [] Acute Respiratory Failure  []Cerebral edema, [] Brain compression/ herniation,   [] Functional quadriplegia  [] Acute blood loss anemia

## 2023-04-12 NOTE — PHYSICAL THERAPY INITIAL EVALUATION ADULT - GENERAL OBSERVATIONS, REHAB EVAL
Pt received semi uspine in bed +hep lock +hemovac +hunt. Pt received consent to treat from JASPAL Mayorga, pt is pOD #0 Fusion of lumbar spine using posterior oblique technique. amb with RW CGA no LOB noted, educated on spinal precautions

## 2023-04-12 NOTE — ASU PATIENT PROFILE, ADULT - AS SC BRADEN FRICTION
Interval History:  NAEO, afebrile, HR 110s-120s. Got 250 albumin overnight. UOP 3.4L on lasix gtt of 2.5. Negative 400mL. Weaned sweep and oxygenator FIO2.    Medications:  Continuous Infusions:   dexmedetomidine (PRECEDEX) infusion 0.1 mcg/kg/hr (05/27/20 1800)    dextrose 10 % in water (D10W)      furosemide (LASIX) 2 mg/mL continuous infusion (non-titrating) 5 mg/hr (05/27/20 1800)    heparin (porcine) in 5 % dex 700 Units/hr (05/27/20 1800)    nicardipine 6 mg/hr (05/27/20 1800)     Scheduled Meds:   albumin human 25%  12.5 g Intravenous Once    amLODIPine  5 mg Oral Daily    aspirin  81 mg Per NG tube Daily    chlorhexidine  15 mL Mouth/Throat BID    diazePAM  0.5 mg Oral QHS    fentaNYL  1 patch Transdermal Q72H    labetalol  200 mg Per G Tube BID    multivitamin liquid no.118  10 mL Per G Tube Daily    pantoprazole  40 mg Intravenous Q12H    piperacillin-tazobactam (ZOSYN) IVPB  4.5 g Intravenous Q8H    polyethylene glycol  17 g Per NG tube Daily    potassium chloride 10%  20 mEq Per NG tube BID    psyllium husk (aspartame)  1 packet Per NG tube TID    QUEtiapine  50 mg Per NG tube BID    sucralfate  1 g Per NG tube Q6H    [START ON 5/28/2020] vancomycin (VANCOCIN) IVPB  1,000 mg Intravenous Q24H    vitamin D  1,000 Units Per G Tube Daily        Objective:     Vital Signs (Most Recent):  Temp: 98.3 °F (36.8 °C) (05/27/20 1500)  Pulse: (!) 137 (05/27/20 1830)  Resp: (!) 41 (05/25/20 0600)  BP: 115/68 (05/22/20 1500)  SpO2: 96 % (05/27/20 1830) Vital Signs (24h Range):  Temp:  [98.3 °F (36.8 °C)-98.5 °F (36.9 °C)] 98.3 °F (36.8 °C)  Pulse:  [102-137] 137  SpO2:  [96 %-100 %] 96 %  Arterial Line BP: ()/(48-76) 139/76     Weight: 58 kg (127 lb 13.9 oz)  Body mass index is 21.95 kg/m².    SpO2: 96 %  O2 Device (Oxygen Therapy): ventilator    Intake/Output - Last 3 Shifts       05/25 0700 - 05/26 0659 05/26 0700 - 05/27 0659 05/27 0700 - 05/28 0659    I.V. (mL/kg) 588.1 (10.1) 998 (17.2)  184 (3.2)    Blood 350      NG/GT 1750 2160 1040    IV Piggyback 450  350    Total Intake(mL/kg) 3138.1 (54.1) 3158 (54.4) 1574 (27.1)    Urine (mL/kg/hr) 2285 (1.6) 3520 (2.5) 2100 (3.1)    Stool 0 0     Blood 7 7 4    Total Output 2292 3527 2104    Net +846.1 -369 -530           Stool Occurrence 1 x 1 x           Lines/Drains/Airways     Peripherally Inserted Central Catheter Line            PICC Double Lumen 05/05/20 1707 right basilic 22 days          Central Venous Catheter Line                 ECMO Cannula 04/25/20 1120 right femoral vein 32 days         ECMO Cannula 04/25/20 1120 right internal jugular 32 days          Drain                 Urethral Catheter 04/18/20 1854 38 days         NG/OG Tube 05/03/20 1215 [place] sump;nasogastric 18 Fr. Left nostril 24 days          Airway                 Surgical Airway 05/05/20 1500 [place] Cuffed 22 days          Arterial Line                 Arterial Line 04/27/20 1100 Right Brachial 30 days          Peripheral Intravenous Line                 Peripheral IV - Single Lumen 05/26/20 0845 18 G Left Upper Arm 1 day                Physical Exam    Constitutional: He is sedated, and intubated.   Head: Normocephalic   Cardiovascular: Tachycardia 120s   Pulmonary/Chest: intubated vent FiO2 50% PEEP 10, ECMO RPM 2700, Flow 2.9, sweep 10.5, oxygenator FiO2 60%  Skin: L neck and L groin cannulas intact, no flashing, clots pre and post-oxygenator, all sutures intact and secure  Nursing note and vitals reviewed.    Significant Labs:  BMP:   Recent Labs   Lab 05/27/20  1400   *   *   K 3.6      CO2 32*   BUN 39*   CREATININE 0.8   CALCIUM 10.0   MG 2.2     CBC:   Recent Labs   Lab 05/27/20  1400 05/27/20  1600   WBC 13.49*  --    RBC 3.52*  --    HGB 10.4*  --    HCT 33.2* 32*   *  --    MCV 94  --    MCH 29.5  --    MCHC 31.3*  --      LFTs:   Recent Labs   Lab 05/27/20  1400   *   AST 46*   ALKPHOS 204*   BILITOT 0.8   PROT 7.1   ALBUMIN 3.8      Significant Diagnostics:  Cxr: no changes     (3) no apparent problem

## 2023-04-12 NOTE — ASU PATIENT PROFILE, ADULT - NSICDXPASTMEDICALHX_GEN_ALL_CORE_FT
PAST MEDICAL HISTORY:  Asthma     Chronic sinusitis     HTN (hypertension)     Lumbar radiculopathy     Type 2 diabetes mellitus      PAST MEDICAL HISTORY:  Asthma     Chronic sinusitis     History of palpitations     HTN (hypertension)     Lumbar radiculopathy     Type 2 diabetes mellitus

## 2023-04-12 NOTE — PRE-ANESTHESIA EVALUATION ADULT - NSANTHPEFT_GEN_ALL_CORE
GEN: A&Ox3, NAD  HEENT: no abnormal facies, neck unremarkable  CV: RRR, no M/R/G  PULM: CTABL, breathing comfortably on RA  NEURO: moves all 4 extremities, no gross deficit

## 2023-04-12 NOTE — PHYSICAL THERAPY INITIAL EVALUATION ADULT - LEVEL OF INDEPENDENCE, REHAB EVAL
[FreeTextEntry1] : Patient has increased shortness of breath this morning. She was seen by the cardiologist and was started back on hydralazine 25mg 3 times a day. Her blood pressure is lower today.\par The patient has no cough. contact guard

## 2023-04-13 DIAGNOSIS — E11.9 TYPE 2 DIABETES MELLITUS WITHOUT COMPLICATIONS: ICD-10-CM

## 2023-04-13 DIAGNOSIS — E83.42 HYPOMAGNESEMIA: ICD-10-CM

## 2023-04-13 DIAGNOSIS — J45.909 UNSPECIFIED ASTHMA, UNCOMPLICATED: ICD-10-CM

## 2023-04-13 DIAGNOSIS — E83.51 HYPOCALCEMIA: ICD-10-CM

## 2023-04-13 DIAGNOSIS — D62 ACUTE POSTHEMORRHAGIC ANEMIA: ICD-10-CM

## 2023-04-13 DIAGNOSIS — I10 ESSENTIAL (PRIMARY) HYPERTENSION: ICD-10-CM

## 2023-04-13 DIAGNOSIS — M54.16 RADICULOPATHY, LUMBAR REGION: ICD-10-CM

## 2023-04-13 LAB
A1C WITH ESTIMATED AVERAGE GLUCOSE RESULT: 5.5 % — SIGNIFICANT CHANGE UP (ref 4–5.6)
ANION GAP SERPL CALC-SCNC: 5 MMOL/L — SIGNIFICANT CHANGE UP (ref 5–17)
BASOPHILS # BLD AUTO: 0 K/UL — SIGNIFICANT CHANGE UP (ref 0–0.2)
BASOPHILS NFR BLD AUTO: 0 % — SIGNIFICANT CHANGE UP (ref 0–2)
BUN SERPL-MCNC: 8 MG/DL — SIGNIFICANT CHANGE UP (ref 7–23)
CALCIUM SERPL-MCNC: 8.3 MG/DL — LOW (ref 8.4–10.5)
CHLORIDE SERPL-SCNC: 107 MMOL/L — SIGNIFICANT CHANGE UP (ref 96–108)
CO2 SERPL-SCNC: 25 MMOL/L — SIGNIFICANT CHANGE UP (ref 22–31)
CREAT SERPL-MCNC: 0.51 MG/DL — SIGNIFICANT CHANGE UP (ref 0.5–1.3)
EGFR: 110 ML/MIN/1.73M2 — SIGNIFICANT CHANGE UP
EOSINOPHIL # BLD AUTO: 0 K/UL — SIGNIFICANT CHANGE UP (ref 0–0.5)
EOSINOPHIL NFR BLD AUTO: 0 % — SIGNIFICANT CHANGE UP (ref 0–6)
ESTIMATED AVERAGE GLUCOSE: 111 MG/DL — SIGNIFICANT CHANGE UP (ref 68–114)
GLUCOSE SERPL-MCNC: 148 MG/DL — HIGH (ref 70–99)
HCT VFR BLD CALC: 29.1 % — LOW (ref 34.5–45)
HGB BLD-MCNC: 9.6 G/DL — LOW (ref 11.5–15.5)
IMM GRANULOCYTES NFR BLD AUTO: 0.4 % — SIGNIFICANT CHANGE UP (ref 0–0.9)
LYMPHOCYTES # BLD AUTO: 0.87 K/UL — LOW (ref 1–3.3)
LYMPHOCYTES # BLD AUTO: 11.2 % — LOW (ref 13–44)
MAGNESIUM SERPL-MCNC: 1.5 MG/DL — LOW (ref 1.6–2.6)
MCHC RBC-ENTMCNC: 26.7 PG — LOW (ref 27–34)
MCHC RBC-ENTMCNC: 33 GM/DL — SIGNIFICANT CHANGE UP (ref 32–36)
MCV RBC AUTO: 81.1 FL — SIGNIFICANT CHANGE UP (ref 80–100)
MONOCYTES # BLD AUTO: 0.23 K/UL — SIGNIFICANT CHANGE UP (ref 0–0.9)
MONOCYTES NFR BLD AUTO: 3 % — SIGNIFICANT CHANGE UP (ref 2–14)
NEUTROPHILS # BLD AUTO: 6.66 K/UL — SIGNIFICANT CHANGE UP (ref 1.8–7.4)
NEUTROPHILS NFR BLD AUTO: 85.4 % — HIGH (ref 43–77)
NRBC # BLD: 0 /100 WBCS — SIGNIFICANT CHANGE UP (ref 0–0)
PHOSPHATE SERPL-MCNC: 3.5 MG/DL — SIGNIFICANT CHANGE UP (ref 2.5–4.5)
PLATELET # BLD AUTO: 273 K/UL — SIGNIFICANT CHANGE UP (ref 150–400)
POTASSIUM SERPL-MCNC: 3.5 MMOL/L — SIGNIFICANT CHANGE UP (ref 3.5–5.3)
POTASSIUM SERPL-SCNC: 3.5 MMOL/L — SIGNIFICANT CHANGE UP (ref 3.5–5.3)
RBC # BLD: 3.59 M/UL — LOW (ref 3.8–5.2)
RBC # FLD: 15.5 % — HIGH (ref 10.3–14.5)
SODIUM SERPL-SCNC: 137 MMOL/L — SIGNIFICANT CHANGE UP (ref 135–145)
WBC # BLD: 7.79 K/UL — SIGNIFICANT CHANGE UP (ref 3.8–10.5)
WBC # FLD AUTO: 7.79 K/UL — SIGNIFICANT CHANGE UP (ref 3.8–10.5)

## 2023-04-13 PROCEDURE — 99255 IP/OBS CONSLTJ NEW/EST HI 80: CPT

## 2023-04-13 RX ORDER — SODIUM CHLORIDE 9 MG/ML
1000 INJECTION, SOLUTION INTRAVENOUS
Refills: 0 | Status: DISCONTINUED | OUTPATIENT
Start: 2023-04-13 | End: 2023-04-14

## 2023-04-13 RX ORDER — METHOCARBAMOL 500 MG/1
500 TABLET, FILM COATED ORAL EVERY 8 HOURS
Refills: 0 | Status: DISCONTINUED | OUTPATIENT
Start: 2023-04-13 | End: 2023-04-14

## 2023-04-13 RX ORDER — INSULIN LISPRO 100/ML
VIAL (ML) SUBCUTANEOUS
Refills: 0 | Status: DISCONTINUED | OUTPATIENT
Start: 2023-04-13 | End: 2023-04-14

## 2023-04-13 RX ORDER — ACETAMINOPHEN 500 MG
1000 TABLET ORAL EVERY 8 HOURS
Refills: 0 | Status: DISCONTINUED | OUTPATIENT
Start: 2023-04-13 | End: 2023-04-18

## 2023-04-13 RX ORDER — DEXTROSE 50 % IN WATER 50 %
25 SYRINGE (ML) INTRAVENOUS ONCE
Refills: 0 | Status: DISCONTINUED | OUTPATIENT
Start: 2023-04-13 | End: 2023-04-14

## 2023-04-13 RX ORDER — MAGNESIUM SULFATE 500 MG/ML
2 VIAL (ML) INJECTION EVERY 6 HOURS
Refills: 0 | Status: COMPLETED | OUTPATIENT
Start: 2023-04-13 | End: 2023-04-13

## 2023-04-13 RX ORDER — ENOXAPARIN SODIUM 100 MG/ML
40 INJECTION SUBCUTANEOUS EVERY 24 HOURS
Refills: 0 | Status: DISCONTINUED | OUTPATIENT
Start: 2023-04-13 | End: 2023-04-18

## 2023-04-13 RX ORDER — DEXTROSE 50 % IN WATER 50 %
12.5 SYRINGE (ML) INTRAVENOUS ONCE
Refills: 0 | Status: DISCONTINUED | OUTPATIENT
Start: 2023-04-13 | End: 2023-04-14

## 2023-04-13 RX ORDER — GABAPENTIN 400 MG/1
300 CAPSULE ORAL THREE TIMES A DAY
Refills: 0 | Status: DISCONTINUED | OUTPATIENT
Start: 2023-04-13 | End: 2023-04-14

## 2023-04-13 RX ORDER — DEXTROSE 50 % IN WATER 50 %
15 SYRINGE (ML) INTRAVENOUS ONCE
Refills: 0 | Status: DISCONTINUED | OUTPATIENT
Start: 2023-04-13 | End: 2023-04-14

## 2023-04-13 RX ORDER — POTASSIUM CHLORIDE 20 MEQ
40 PACKET (EA) ORAL ONCE
Refills: 0 | Status: COMPLETED | OUTPATIENT
Start: 2023-04-13 | End: 2023-04-13

## 2023-04-13 RX ADMIN — LOSARTAN POTASSIUM 100 MILLIGRAM(S): 100 TABLET, FILM COATED ORAL at 06:02

## 2023-04-13 RX ADMIN — Medication 1000 MILLIGRAM(S): at 22:24

## 2023-04-13 RX ADMIN — OXYCODONE HYDROCHLORIDE 5 MILLIGRAM(S): 5 TABLET ORAL at 23:05

## 2023-04-13 RX ADMIN — OXYCODONE HYDROCHLORIDE 5 MILLIGRAM(S): 5 TABLET ORAL at 16:02

## 2023-04-13 RX ADMIN — Medication 1000 MILLIGRAM(S): at 14:09

## 2023-04-13 RX ADMIN — GABAPENTIN 300 MILLIGRAM(S): 400 CAPSULE ORAL at 22:24

## 2023-04-13 RX ADMIN — POLYETHYLENE GLYCOL 3350 17 GRAM(S): 17 POWDER, FOR SOLUTION ORAL at 11:02

## 2023-04-13 RX ADMIN — GABAPENTIN 300 MILLIGRAM(S): 400 CAPSULE ORAL at 13:09

## 2023-04-13 RX ADMIN — ENOXAPARIN SODIUM 40 MILLIGRAM(S): 100 INJECTION SUBCUTANEOUS at 22:23

## 2023-04-13 RX ADMIN — Medication 25 GRAM(S): at 13:08

## 2023-04-13 RX ADMIN — OXYCODONE HYDROCHLORIDE 5 MILLIGRAM(S): 5 TABLET ORAL at 11:02

## 2023-04-13 RX ADMIN — OXYCODONE HYDROCHLORIDE 5 MILLIGRAM(S): 5 TABLET ORAL at 15:02

## 2023-04-13 RX ADMIN — SENNA PLUS 2 TABLET(S): 8.6 TABLET ORAL at 22:23

## 2023-04-13 RX ADMIN — Medication 100 MILLIGRAM(S): at 06:01

## 2023-04-13 RX ADMIN — Medication 25 GRAM(S): at 08:17

## 2023-04-13 RX ADMIN — OXYCODONE HYDROCHLORIDE 5 MILLIGRAM(S): 5 TABLET ORAL at 06:02

## 2023-04-13 RX ADMIN — Medication 200 MILLIGRAM(S): at 07:25

## 2023-04-13 RX ADMIN — AMLODIPINE BESYLATE 5 MILLIGRAM(S): 2.5 TABLET ORAL at 06:02

## 2023-04-13 RX ADMIN — METHOCARBAMOL 500 MILLIGRAM(S): 500 TABLET, FILM COATED ORAL at 13:08

## 2023-04-13 RX ADMIN — Medication 40 MILLIEQUIVALENT(S): at 07:25

## 2023-04-13 RX ADMIN — OXYCODONE HYDROCHLORIDE 5 MILLIGRAM(S): 5 TABLET ORAL at 12:02

## 2023-04-13 RX ADMIN — OXYCODONE HYDROCHLORIDE 5 MILLIGRAM(S): 5 TABLET ORAL at 19:01

## 2023-04-13 RX ADMIN — METHOCARBAMOL 500 MILLIGRAM(S): 500 TABLET, FILM COATED ORAL at 22:24

## 2023-04-13 RX ADMIN — Medication 1000 MILLIGRAM(S): at 13:09

## 2023-04-13 RX ADMIN — OXYCODONE HYDROCHLORIDE 5 MILLIGRAM(S): 5 TABLET ORAL at 06:32

## 2023-04-13 RX ADMIN — Medication 50 MILLIGRAM(S): at 06:03

## 2023-04-13 RX ADMIN — Medication 1000 MILLIGRAM(S): at 23:24

## 2023-04-13 NOTE — PATIENT PROFILE ADULT - FALL HARM RISK - HARM RISK INTERVENTIONS

## 2023-04-13 NOTE — OCCUPATIONAL THERAPY INITIAL EVALUATION ADULT - ADDITIONAL COMMENTS
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 pt states children work and are gone from 8-5 daily. pt was independent with ADLs and functional mobility ( however received some occasional assist for LB dressing). BR with tub/shower combo and standard toilet

## 2023-04-13 NOTE — OCCUPATIONAL THERAPY INITIAL EVALUATION ADULT - GENERAL OBSERVATIONS, REHAB EVAL
RN Collette clearing pt. for OOB. Pt. received semi-supine, + b/l SCDs, + heplock, + hemovac, + TLSO brace don in NAD, c/o of 10/10 pain however agreeable to therapy. RN Collette clearing pt. for OOB. Pt. received semi-supine, + b/l SCDs, + heplock, + hemovac, + LSO brace don in NAD, c/o of 10/10 pain however agreeable to therapy.

## 2023-04-13 NOTE — PROGRESS NOTE ADULT - SUBJECTIVE AND OBJECTIVE BOX
HOSPITALIST INITIAL CONSULT NOTE    CHIEF COMPLAINT:      HPI:  57F diagnosed with severe L4-S1 b/I foraminal stenosis with L4-5 spondylolisthesis. Past medical history DM, HT who presents today for elective L4-5 fusion. Ms. Ramon states that she has suffered from chronic back pain for several years. She states her symptoms have progressed within the past year. She describes LBP radiating to RLE with intermittent numbness/tingling. Pain/numbness/tingling involves the entire leg to the foot. She also reports RLE weakness. She has done physical therapy for several years which initially helped with symptoms but now is ineffective. She also reports several injections by pain management over the past year which have been ineffective. 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 She can only walk several city blocks before sitting down to rest due to pain. At present, she states her low back pain and leg pain is greatly affecting her quality of life.   (2023 06:06)    PAST MEDICAL & SURGICAL HISTORY:  Lumbar radiculopathy  HTN (hypertension)  Asthma  Chronic sinusitis  Type 2 diabetes mellitus  History of palpitations  S/P   H/O knee surgery left    REVIEW OF SYSTEMS:  As per HPI, otherwise negative for Constitutional, Eyes, Ears/Nose/Mouth/Throat, Neck, Cardiovascular, Respiratory, Gastrointestinal, Genitourinary, Skin, Endocrine, Musculoskeletal, Psychiatric, and Hematologic/Lymphatic.    MEDICATIONS  (STANDING):  acetaminophen     Tablet .. 1000 milliGRAM(s) Oral every 8 hours  amLODIPine   Tablet 5 milliGRAM(s) Oral daily  enoxaparin Injectable 40 milliGRAM(s) SubCutaneous every 24 hours  gabapentin 300 milliGRAM(s) Oral three times a day  glucagon  Injectable 1 milliGRAM(s) IntraMuscular Once  losartan 100 milliGRAM(s) Oral daily  magnesium sulfate  IVPB 2 Gram(s) IV Intermittent every 6 hours  methocarbamol 500 milliGRAM(s) Oral every 8 hours  metoprolol succinate  milliGRAM(s) Oral daily  polyethylene glycol 3350 17 Gram(s) Oral daily  senna 2 Tablet(s) Oral at bedtime    MEDICATIONS  (PRN):  albuterol    90 MICROgram(s) HFA Inhaler 2 Puff(s) Inhalation daily PRN Bronchospasm  oxyCODONE    IR 5 milliGRAM(s) Oral every 4 hours PRN Severe Pain (7 - 10)    Allergies  Compazine (Other)  penicillin (Other)    Intolerances    FAMILY HISTORY:  FH ischemic heart disease (Mother)        Social History:      VITALS  Vital Signs Last 24 Hrs  T(C): 36.4 (2023 08:13), Max: 36.5 (2023 05:50)  T(F): 97.6 (2023 08:13), Max: 97.7 (2023 05:50)  HR: 67 (2023 08:13) (53 - 74)  BP: 130/85 (2023 08:13) (90/55 - 131/77)  BP(mean): 93 (2023 18:21) (68 - 93)  RR: 16 (2023 08:13) (7 - 21)  SpO2: 99% (2023 08:13) (97% - 100%)    Parameters below as of 2023 08:13  Patient On (Oxygen Delivery Method): room air        I&O's Summary    2023 07:01  -  2023 07:00  --------------------------------------------------------  IN: 1150 mL / OUT: 400 mL / NET: 750 mL        CAPILLARY BLOOD GLUCOSE  POCT Blood Glucose.: 83 mg/dL (2023 11:37)  POCT Blood Glucose.: 118 mg/dL (2023 07:02)  POCT Blood Glucose.: 127 mg/dL (2023 21:31)  POCT Blood Glucose.: 118 mg/dL (2023 18:23)  POCT Blood Glucose.: 116 mg/dL (2023 14:05)      PHYSICAL EXAM  General: A&Ox3; NAD  Head: NC/AT; PERRL; EOMI; anicteric sclera  Neck: Supple; no JVD  Respiratory: CTA B/L; no wheezes/crackles/rales auscultated w/ good air movement  Cardiovascular: Regular rhythm/rate; S1/S2; no gallops or murmurs auscultated  Gastrointestinal: Soft; NTND w/out rebound tenderness or guarding; bowel sounds normal  Extremities: WWP; no edema or cyanosis; radial/pedal pulses palpable  Neurological:  CNII-XII grossly intact; no obvious focal deficits  Skin: No rashes noted, drains in place  Vasc: +2 DP/PT pulses b/l   Psych: Appropriate Affect    LABS:                        9.6    7.79  )-----------( 273      ( 2023 05:30 )             29.1         137  |  107  |  8   ----------------------------<  148<H>  3.5   |  25  |  0.51    Ca    8.3<L>      2023 05:30  Phos  3.5       Mg     1.5                 RADIOLOGY & ADDITIONAL STUDIES:  reviewed

## 2023-04-13 NOTE — OCCUPATIONAL THERAPY INITIAL EVALUATION ADULT - DIAGNOSIS, OT EVAL
Pt. is POD #1 s/p fusion of lumbar spine using posterior oblique technique Pt. is POD #1 s/p fusion of lumbar spine using posterior oblique technique. Upon assessment, pt. demo impairments in ability to complete ADLs and functional mob at her prior level 2/2 pain, decreased balance and activity tolerance.

## 2023-04-13 NOTE — OCCUPATIONAL THERAPY INITIAL EVALUATION ADULT - PERTINENT HX OF CURRENT PROBLEM, REHAB EVAL
57F diagnosed with severe L4-S1 b/I foraminal stenosis with L4-5 spondylolisthesis. Past medical history DM, HT who presents today for elective L4-5 fusion. Ms. Ramon states that she has suffered from chronic back pain for several years. She states her symptoms have progressed within the past year. She describes LBP radiating to RLE with intermittent numbness/tingling. Pain/numbness/tingling involves the entire leg to the foot. She also reports RLE weakness. She has done physical therapy for several years which initially helped with symptoms but now is ineffective. She also reports several injections by pain management over the past year which have been ineffective.

## 2023-04-13 NOTE — OCCUPATIONAL THERAPY INITIAL EVALUATION ADULT - MODIFIED CLINICAL TEST OF SENSORY INTEGRATION IN BALANCE TEST
Patient performed functional mobility from bed<-> BR with CGA and RW, no LOB noted. Of note towards end of walk, pt. reports dizziness, VSS, blood sugar checked by RN

## 2023-04-13 NOTE — CHART NOTE - NSCHARTNOTEFT_GEN_A_CORE
POD1 L4-5 lami/fusion. Pain better controlled overnight. Neuro exam stable. LSO delivered, hunt d'marlon today, passed TOV. SQL tonight. Pt complains of continued back pain,lyrica changed to gabapentin 300 tid. Hemovac drain remains in place. POD1 L4-5 lami/fusion. Pain better controlled overnight. Neuro exam stable. LSO delivered, marilee d'marlon today, passed TOV. SQL tonight. Pt complains of continued back pain,lyrica changed to gabapentin 300 tid. Hemovac drain remains in place. Pt worked w/ physical therapy today and is recommended for Acute Rehab.

## 2023-04-13 NOTE — PROGRESS NOTE ADULT - SUBJECTIVE AND OBJECTIVE BOX
HPI:  57F diagnosed with severe L4-S1 b/I foraminal stenosis with L4-5 spondylolisthesis. Past medical history DM, HT who presents today for elective L4-5 fusion. Ms. Ramon states that she has suffered from chronic back pain for several years. She states her symptoms have progressed within the past year. She describes LBP radiating to RLE with intermittent numbness/tingling. Pain/numbness/tingling involves the entire leg to the foot. She also reports RLE weakness. She has done physical therapy for several years which initially helped with symptoms but now is ineffective. She also reports several injections by pain management over the past year which have been ineffective. 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 She can only walk several city blocks before sitting down to rest due to pain. At present, she states her low back pain and leg pain is greatly affecting her quality of life.     (2023 06:06)    Hospital Course:  : POD 0 L4-5 laminectomy and fusion. Pt slower to wake up from anesthesia and complaining of 10/10 back pain. Pt received Dilaudid 0.5 for pain.   : POD1 L4-5 lami/fusion. Pain better controlled overnight. Neuro exam stable.    Vital Signs Last 24 Hrs  T(C): 36.4 (2023 21:20), Max: 36.4 (2023 21:20)  T(F): 97.5 (2023 21:20), Max: 97.5 (2023 21:20)  HR: 60 (2023 21:20) (53 - 74)  BP: 131/77 (2023 21:20) (90/55 - 146/83)  BP(mean): 93 (2023 18:21) (68 - 93)  RR: 18 (2023 21:20) (7 - 21)  SpO2: 97% (2023 21:20) (97% - 100%)    Parameters below as of 2023 21:20  Patient On (Oxygen Delivery Method): room air        I&O's Summary    2023 07:01  -  2023 23:24  --------------------------------------------------------  IN: 625 mL / OUT: 340 mL / NET: 285 mL        PHYSICAL EXAM:  General: Pt is laying in hospital bed,NAD  Cardiovascular: RRR, normal S1 and S2   Respiratory: non-labored breathing, symmetric chest rise, CTA b/l  GI: abd soft, NTND   Neuro: A&O x 3, no aphasia, speech clear and fluent  CNII-XII: EOM intact, PERRL, face symmetric  Motor: UE 5/5 throughout B/L, B/L 3/5 HF (limited 2/2 pain,) otherwise 4/5 throughout   Wounds: lumbar incision site with gauze dressing in place C/D/I    TUBES/LINES:  [x] Jensen  [] Lumbar Drain  [x] Wound Drains: one deep HMV to full suction draining sanguinous fluid  [] Others    DIET:  [] NPO  [x] Mechanical  [] Tube feeds    LABS:                        10.4   7.04  )-----------( 302      ( 2023 16:10 )             32.2     04-12    139  |  108  |  8   ----------------------------<  144<H>  3.7   |  25  |  0.52    Ca    8.6      2023 16:10              CAPILLARY BLOOD GLUCOSE      POCT Blood Glucose.: 127 mg/dL (2023 21:31)  POCT Blood Glucose.: 118 mg/dL (2023 18:23)  POCT Blood Glucose.: 116 mg/dL (2023 14:05)  POCT Blood Glucose.: 89 mg/dL (2023 06:52)      Drug Levels: [] N/A    CSF Analysis: [] N/A      Allergies    Compazine (Other)  penicillin (Other)    Intolerances      MEDICATIONS:  Antibiotics:  ceFAZolin   IVPB 2000 milliGRAM(s) IV Intermittent every 8 hours    Neuro:  acetaminophen     Tablet .. 1000 milliGRAM(s) Oral every 8 hours PRN  methocarbamol 500 milliGRAM(s) Oral every 8 hours PRN  oxyCODONE    IR 5 milliGRAM(s) Oral every 4 hours PRN  pregabalin 50 milliGRAM(s) Oral every 8 hours    Anticoagulation:    OTHER:  albuterol    90 MICROgram(s) HFA Inhaler 2 Puff(s) Inhalation daily PRN  amLODIPine   Tablet 5 milliGRAM(s) Oral daily  dextrose 50% Injectable 25 Gram(s) IV Push once  dextrose 50% Injectable 12.5 Gram(s) IV Push once  dextrose 50% Injectable 25 Gram(s) IV Push once  dextrose Oral Gel 15 Gram(s) Oral once PRN  glucagon  Injectable 1 milliGRAM(s) IntraMuscular once  glucagon  Injectable 1 milliGRAM(s) IntraMuscular Once  insulin lispro (ADMELOG) corrective regimen sliding scale   SubCutaneous three times a day before meals  insulin lispro (ADMELOG) corrective regimen sliding scale   SubCutaneous at bedtime  losartan 100 milliGRAM(s) Oral daily  metoprolol succinate  milliGRAM(s) Oral daily  polyethylene glycol 3350 17 Gram(s) Oral daily  senna 2 Tablet(s) Oral at bedtime    IVF:  dextrose 5%. 1000 milliLiter(s) IV Continuous <Continuous>  dextrose 5%. 1000 milliLiter(s) IV Continuous <Continuous>  sodium chloride 0.9%. 1000 milliLiter(s) IV Continuous <Continuous>    CULTURES:    RADIOLOGY & ADDITIONAL TESTS:      ASSESSMENT:  Pt is a 57F w/ PMHx of DM and chronic back pain found to have severe, B/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 Pt is now s/p L4-5 laminectomy with fusion (23).    M53.2X6    FH ischemic heart disease (Mother)    Handoff    MEWS Score    Lumbar radiculopathy    HTN (hypertension)    Asthma    Chronic sinusitis    Type 2 diabetes mellitus    History of palpitations    Lumbar radiculopathy    Lumbar radiculopathy    Fusion of lumbar spine using posterior oblique technique    S/P     H/O knee surgery    SysAdmin_VstLnk      PLAN:  Neuro:  - Neuro/vital checks q4hr  - Pain management with Tylenol 1g, Oxy 5mg prin, Lyrica 50mg Q 8hrs, robaxin  - HMV drain (deep)- monitor output  - standing xrays prior to discharge  - LSO brace pending    Cardio:  - SBP goal   - Continue home Metoprolol and Amlodipine     Pulm:  - Incentive spirometry   - NC PRN  - Albuterol PRN    GI:  - Consistent carb diet   - Bowel regimen    Endo:  - ISS  - F/U A1c    Renal:  - Jensen- F/U TOV in AM  - Electrolytes prn    Heme:  - SCDs for DVT prophylaxis     ID  - Post-op Ancef     Discussed with Dr. Penny

## 2023-04-14 ENCOUNTER — TRANSCRIPTION ENCOUNTER (OUTPATIENT)
Age: 58
End: 2023-04-14

## 2023-04-14 LAB
ANION GAP SERPL CALC-SCNC: 7 MMOL/L — SIGNIFICANT CHANGE UP (ref 5–17)
BASOPHILS # BLD AUTO: 0.02 K/UL — SIGNIFICANT CHANGE UP (ref 0–0.2)
BASOPHILS NFR BLD AUTO: 0.3 % — SIGNIFICANT CHANGE UP (ref 0–2)
BUN SERPL-MCNC: 10 MG/DL — SIGNIFICANT CHANGE UP (ref 7–23)
CALCIUM SERPL-MCNC: 8.6 MG/DL — SIGNIFICANT CHANGE UP (ref 8.4–10.5)
CHLORIDE SERPL-SCNC: 105 MMOL/L — SIGNIFICANT CHANGE UP (ref 96–108)
CO2 SERPL-SCNC: 26 MMOL/L — SIGNIFICANT CHANGE UP (ref 22–31)
CREAT SERPL-MCNC: 0.6 MG/DL — SIGNIFICANT CHANGE UP (ref 0.5–1.3)
EGFR: 106 ML/MIN/1.73M2 — SIGNIFICANT CHANGE UP
EOSINOPHIL # BLD AUTO: 0.02 K/UL — SIGNIFICANT CHANGE UP (ref 0–0.5)
EOSINOPHIL NFR BLD AUTO: 0.3 % — SIGNIFICANT CHANGE UP (ref 0–6)
GLUCOSE SERPL-MCNC: 97 MG/DL — SIGNIFICANT CHANGE UP (ref 70–99)
HCT VFR BLD CALC: 29.2 % — LOW (ref 34.5–45)
HGB BLD-MCNC: 9.4 G/DL — LOW (ref 11.5–15.5)
IMM GRANULOCYTES NFR BLD AUTO: 0.3 % — SIGNIFICANT CHANGE UP (ref 0–0.9)
LYMPHOCYTES # BLD AUTO: 2.88 K/UL — SIGNIFICANT CHANGE UP (ref 1–3.3)
LYMPHOCYTES # BLD AUTO: 39.3 % — SIGNIFICANT CHANGE UP (ref 13–44)
MAGNESIUM SERPL-MCNC: 1.9 MG/DL — SIGNIFICANT CHANGE UP (ref 1.6–2.6)
MCHC RBC-ENTMCNC: 26.5 PG — LOW (ref 27–34)
MCHC RBC-ENTMCNC: 32.2 GM/DL — SIGNIFICANT CHANGE UP (ref 32–36)
MCV RBC AUTO: 82.3 FL — SIGNIFICANT CHANGE UP (ref 80–100)
MONOCYTES # BLD AUTO: 0.45 K/UL — SIGNIFICANT CHANGE UP (ref 0–0.9)
MONOCYTES NFR BLD AUTO: 6.1 % — SIGNIFICANT CHANGE UP (ref 2–14)
NEUTROPHILS # BLD AUTO: 3.94 K/UL — SIGNIFICANT CHANGE UP (ref 1.8–7.4)
NEUTROPHILS NFR BLD AUTO: 53.7 % — SIGNIFICANT CHANGE UP (ref 43–77)
NRBC # BLD: 0 /100 WBCS — SIGNIFICANT CHANGE UP (ref 0–0)
PHOSPHATE SERPL-MCNC: 3.2 MG/DL — SIGNIFICANT CHANGE UP (ref 2.5–4.5)
PLATELET # BLD AUTO: 279 K/UL — SIGNIFICANT CHANGE UP (ref 150–400)
POTASSIUM SERPL-MCNC: 4.2 MMOL/L — SIGNIFICANT CHANGE UP (ref 3.5–5.3)
POTASSIUM SERPL-SCNC: 4.2 MMOL/L — SIGNIFICANT CHANGE UP (ref 3.5–5.3)
RBC # BLD: 3.55 M/UL — LOW (ref 3.8–5.2)
RBC # FLD: 16 % — HIGH (ref 10.3–14.5)
SODIUM SERPL-SCNC: 138 MMOL/L — SIGNIFICANT CHANGE UP (ref 135–145)
WBC # BLD: 7.33 K/UL — SIGNIFICANT CHANGE UP (ref 3.8–10.5)
WBC # FLD AUTO: 7.33 K/UL — SIGNIFICANT CHANGE UP (ref 3.8–10.5)

## 2023-04-14 PROCEDURE — 99233 SBSQ HOSP IP/OBS HIGH 50: CPT

## 2023-04-14 PROCEDURE — 72100 X-RAY EXAM L-S SPINE 2/3 VWS: CPT | Mod: 26

## 2023-04-14 RX ORDER — METHOCARBAMOL 500 MG/1
750 TABLET, FILM COATED ORAL EVERY 8 HOURS
Refills: 0 | Status: DISCONTINUED | OUTPATIENT
Start: 2023-04-14 | End: 2023-04-18

## 2023-04-14 RX ORDER — GABAPENTIN 400 MG/1
400 CAPSULE ORAL EVERY 8 HOURS
Refills: 0 | Status: DISCONTINUED | OUTPATIENT
Start: 2023-04-14 | End: 2023-04-18

## 2023-04-14 RX ADMIN — Medication 1000 MILLIGRAM(S): at 21:40

## 2023-04-14 RX ADMIN — SENNA PLUS 2 TABLET(S): 8.6 TABLET ORAL at 21:41

## 2023-04-14 RX ADMIN — Medication 1000 MILLIGRAM(S): at 13:39

## 2023-04-14 RX ADMIN — Medication 1000 MILLIGRAM(S): at 06:32

## 2023-04-14 RX ADMIN — GABAPENTIN 400 MILLIGRAM(S): 400 CAPSULE ORAL at 13:40

## 2023-04-14 RX ADMIN — OXYCODONE HYDROCHLORIDE 5 MILLIGRAM(S): 5 TABLET ORAL at 16:31

## 2023-04-14 RX ADMIN — OXYCODONE HYDROCHLORIDE 5 MILLIGRAM(S): 5 TABLET ORAL at 17:31

## 2023-04-14 RX ADMIN — OXYCODONE HYDROCHLORIDE 5 MILLIGRAM(S): 5 TABLET ORAL at 00:05

## 2023-04-14 RX ADMIN — OXYCODONE HYDROCHLORIDE 5 MILLIGRAM(S): 5 TABLET ORAL at 03:09

## 2023-04-14 RX ADMIN — LOSARTAN POTASSIUM 100 MILLIGRAM(S): 100 TABLET, FILM COATED ORAL at 05:33

## 2023-04-14 RX ADMIN — METHOCARBAMOL 750 MILLIGRAM(S): 500 TABLET, FILM COATED ORAL at 21:40

## 2023-04-14 RX ADMIN — OXYCODONE HYDROCHLORIDE 5 MILLIGRAM(S): 5 TABLET ORAL at 04:09

## 2023-04-14 RX ADMIN — OXYCODONE HYDROCHLORIDE 5 MILLIGRAM(S): 5 TABLET ORAL at 09:06

## 2023-04-14 RX ADMIN — OXYCODONE HYDROCHLORIDE 5 MILLIGRAM(S): 5 TABLET ORAL at 21:41

## 2023-04-14 RX ADMIN — POLYETHYLENE GLYCOL 3350 17 GRAM(S): 17 POWDER, FOR SOLUTION ORAL at 13:40

## 2023-04-14 RX ADMIN — OXYCODONE HYDROCHLORIDE 5 MILLIGRAM(S): 5 TABLET ORAL at 22:41

## 2023-04-14 RX ADMIN — GABAPENTIN 400 MILLIGRAM(S): 400 CAPSULE ORAL at 21:40

## 2023-04-14 RX ADMIN — Medication 1000 MILLIGRAM(S): at 05:32

## 2023-04-14 RX ADMIN — METHOCARBAMOL 750 MILLIGRAM(S): 500 TABLET, FILM COATED ORAL at 13:40

## 2023-04-14 RX ADMIN — METHOCARBAMOL 500 MILLIGRAM(S): 500 TABLET, FILM COATED ORAL at 05:33

## 2023-04-14 RX ADMIN — AMLODIPINE BESYLATE 5 MILLIGRAM(S): 2.5 TABLET ORAL at 05:33

## 2023-04-14 RX ADMIN — GABAPENTIN 300 MILLIGRAM(S): 400 CAPSULE ORAL at 05:33

## 2023-04-14 RX ADMIN — ENOXAPARIN SODIUM 40 MILLIGRAM(S): 100 INJECTION SUBCUTANEOUS at 21:41

## 2023-04-14 RX ADMIN — Medication 1000 MILLIGRAM(S): at 22:40

## 2023-04-14 RX ADMIN — OXYCODONE HYDROCHLORIDE 5 MILLIGRAM(S): 5 TABLET ORAL at 10:06

## 2023-04-14 RX ADMIN — Medication 200 MILLIGRAM(S): at 05:33

## 2023-04-14 NOTE — DISCHARGE NOTE PROVIDER - HOSPITAL COURSE
HPI:  57F diagnosed with severe L4-S1 b/I foraminal stenosis with L4-5 spondylolisthesis. Past medical history DM, HT who presents today for elective L4-5 fusion. Ms. Ramon states that she has suffered from chronic back pain for several years. She states her symptoms have progressed within the past year. She describes LBP radiating to RLE with intermittent numbness/tingling. Pain/numbness/tingling involves the entire leg to the foot. She also reports RLE weakness. She has done physical therapy for several years which initially helped with symptoms but now is ineffective. She also reports several injections by pain management over the past year which have been ineffective. 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 She can only walk several city blocks before sitting down to rest due to pain. At present, she states her low back pain and leg pain is greatly affecting her quality of life.      Hospital Course:  4/12: POD 0 L4-5 laminectomy and fusion. Pt slower to wake up from anesthesia and complaining of 10/10 back pain. Pt received Dilaudid 0.5 for pain.   4/13: POD1 L4-5 lami/fusion. Pain better controlled overnight. Neuro exam stable. LSO delivered, hunt d'marlon today, passed TOV. SQL tonight. lyrica changed to gabapentin 300 tid  4/14: POD2 L4-5 lami/fusion. BREONNA overnight. Neuro exam stable.    Patient evaluated by PT/OT who recommended:  Patient is going home? rehab? hospice? Facility Name:     Hospital course c/b:     Exam on day of discharge:    Checklist:   - Obtained follow up appointment from NP  - Reviewed final recommendations of inpatient consults  - review discharge planning on provider handoff  - post op imaging completed  - Neurologically stable for discharge  - Vitals stable for discharge   - Afebrile for discharge  - WBC is stable  - Sodium level is normal  - Pain is adequately controlled  - Pt has PICC/walker/brace/collar   - LACE score (10 or > needs PCP apt)   - stroke patient? Dishcarge NIHSS score   HPI:  57F diagnosed with severe L4-S1 b/I foraminal stenosis with L4-5 spondylolisthesis. Past medical history DM, HT who presents today for elective L4-5 fusion. Ms. Ramon states that she has suffered from chronic back pain for several years. She states her symptoms have progressed within the past year. She describes LBP radiating to RLE with intermittent numbness/tingling. Pain/numbness/tingling involves the entire leg to the foot. She also reports RLE weakness. She has done physical therapy for several years which initially helped with symptoms but now is ineffective. She also reports several injections by pain management over the past year which have been ineffective. 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 She can only walk several city blocks before sitting down to rest due to pain. At present, she states her low back pain and leg pain is greatly affecting her quality of life.      Hospital Course:  4/12: POD 0 L4-5 laminectomy and fusion. Pt slower to wake up from anesthesia and complaining of 10/10 back pain. Pt received Dilaudid 0.5 for pain.   4/13: POD1 L4-5 lami/fusion. Pain better controlled overnight. Neuro exam stable. LSO delivered, hunt d'marlon today, passed TOV. SQL tonight. lyrica changed to gabapentin 300 tid  4/14: POD2 L4-5 lami/fusion. BREONNA overnight. Neuro exam stable.  4/15: POD 3 L4-5 lami/fusion   4/16: POD 4 L4-5 lami/fusion   4/17: POD 5 L4-5 lami/fusion. Pending AR.       Patient evaluated by PT/OT who recommended: Acute Rehab   Patient is going to [facility] course uncomplicated     Exam on day of discharge:    Checklist:   - Obtained follow up appointment from NP  - Reviewed final recommendations of inpatient consults  - review discharge planning on provider handoff  - post op imaging completed  - Neurologically stable for discharge  - Vitals stable for discharge   - Afebrile for discharge  - WBC is stable  - Sodium level is normal  - Pain is adequately controlled  - Pt has PICC/walker/brace/collar   - LACE score (10 or > needs PCP apt)   - stroke patient? Dishcarge NIHSS score   HPI:  57F diagnosed with severe L4-S1 b/I foraminal stenosis with L4-5 spondylolisthesis. Past medical history DM, HT who presents today for elective L4-5 fusion. Ms. Ramon states that she has suffered from chronic back pain for several years. She states her symptoms have progressed within the past year. She describes LBP radiating to RLE with intermittent numbness/tingling. Pain/numbness/tingling involves the entire leg to the foot. She also reports RLE weakness. She has done physical therapy for several years which initially helped with symptoms but now is ineffective. She also reports several injections by pain management over the past year which have been ineffective. 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 She can only walk several city blocks before sitting down to rest due to pain. At present, she states her low back pain and leg pain is greatly affecting her quality of life.      Hospital Course:  4/12: POD 0 L4-5 laminectomy and fusion. Pt slower to wake up from anesthesia and complaining of 10/10 back pain. Pt received Dilaudid 0.5 for pain.   4/13: POD1 L4-5 lami/fusion. Pain better controlled overnight. Neuro exam stable. LSO delivered, hunt d'marlon today, passed TOV. SQL tonight. lyrica changed to gabapentin 300 tid  4/14: POD2 L4-5 lami/fusion. BREONNA overnight. Neuro exam stable.  4/15: POD 3 L4-5 lami/fusion   4/16: POD 4 L4-5 lami/fusion   4/17: POD 5 L4-5 lami/fusion. HMV d/c. Pending AR.       Patient evaluated by PT/OT who recommended: Acute Rehab   Patient is going to [facility] course uncomplicated     Exam on day of discharge:    Checklist:   - Obtained follow up appointment from NP  - Reviewed final recommendations of inpatient consults  - review discharge planning on provider handoff  - post op imaging completed  - Neurologically stable for discharge  - Vitals stable for discharge   - Afebrile for discharge  - WBC is stable  - Sodium level is normal  - Pain is adequately controlled  - Pt has PICC/walker/brace/collar   - LACE score (10 or > needs PCP apt)   - stroke patient? Dishcarge NIHSS score   HPI:  57F diagnosed with severe L4-S1 b/I foraminal stenosis with L4-5 spondylolisthesis. Past medical history DM, HT who presents today for elective L4-5 fusion. Ms. Ramon states that she has suffered from chronic back pain for several years. She states her symptoms have progressed within the past year. She describes LBP radiating to RLE with intermittent numbness/tingling. Pain/numbness/tingling involves the entire leg to the foot. She also reports RLE weakness. She has done physical therapy for several years which initially helped with symptoms but now is ineffective. She also reports several injections by pain management over the past year which have been ineffective. 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 She can only walk several city blocks before sitting down to rest due to pain. At present, she states her low back pain and leg pain is greatly affecting her quality of life.      Hospital Course:  4/12: POD 0 L4-5 laminectomy and fusion. Pt slower to wake up from anesthesia and complaining of 10/10 back pain. Pt received Dilaudid 0.5 for pain.   4/13: POD1 L4-5 lami/fusion. Pain better controlled overnight. Neuro exam stable. LSO delivered, hunt d'marlon today, passed TOV. SQL tonight. lyrica changed to gabapentin 300 tid  4/14: POD2 L4-5 lami/fusion. BREONNA overnight. Neuro exam stable.  4/15: POD 3 L4-5 lami/fusion   4/16: POD 4 L4-5 lami/fusion   4/17: POD 5 L4-5 lami/fusion. HMV d/c. Pending AR.   4/18: POD 6 L4-5 lami/fusion. Accepted to [facility], pending dispo.       Patient evaluated by PT/OT who recommended: Acute Rehab   Patient is going to [facility]     Hospital course uncomplicated     Exam on day of discharge:  General: Pt is laying in hospital bed, NAD  Cardiovascular: RRR, normal S1 and S2   Respiratory: non-labored breathing, symmetric chest rise, CTA b/l  GI: abd soft, NTND   Neuro: A&O x 3, no aphasia, speech clear and fluent  CNII-XII: EOM intact, PERRL, face symmetric  Motor: DE [place] 5/5 throughout UE/LE   Wounds: lumbar incision site c/d/i, steristrips over HMV removal site     Patient is neuro stable, vitals stable, afebrile, medically ready for discharge

## 2023-04-14 NOTE — PROGRESS NOTE ADULT - SUBJECTIVE AND OBJECTIVE BOX
O/N Events:  Subjective/ROS: Denies HA, CP, SOB, n/v, changes in bowel/urinary habits.  12pt ROS otherwise negative.    VITALS  Vital Signs Last 24 Hrs  T(C): 36.8 (14 Apr 2023 08:56), Max: 36.8 (13 Apr 2023 21:12)  T(F): 98.2 (14 Apr 2023 08:56), Max: 98.3 (13 Apr 2023 21:12)  HR: 68 (14 Apr 2023 08:56) (68 - 75)  BP: 124/85 (14 Apr 2023 08:56) (115/79 - 124/85)  BP(mean): 98 (14 Apr 2023 08:56) (98 - 98)  RR: 17 (14 Apr 2023 08:56) (16 - 17)  SpO2: 98% (14 Apr 2023 08:56) (96% - 98%)    Parameters below as of 14 Apr 2023 08:56  Patient On (Oxygen Delivery Method): room air        I&O's Summary    13 Apr 2023 07:01  -  14 Apr 2023 07:00  --------------------------------------------------------  IN: 0 mL / OUT: 585 mL / NET: -585 mL    14 Apr 2023 07:01  -  14 Apr 2023 15:55  --------------------------------------------------------  IN: 0 mL / OUT: 60 mL / NET: -60 mL        CAPILLARY BLOOD GLUCOSE      POCT Blood Glucose.: 106 mg/dL (13 Apr 2023 21:56)  POCT Blood Glucose.: 115 mg/dL (13 Apr 2023 17:51)      PHYSICAL EXAM  General: A&Ox3; NAD  Head: NC/AT; PERRL; EOMI; anicteric sclera  Neck: Supple; no JVD  Respiratory: CTA B/L; no wheezes/crackles/rales auscultated w/ good air movement  Cardiovascular: Regular rhythm/rate; S1/S2; no gallops or murmurs auscultated  Gastrointestinal: Soft; NTND w/out rebound tenderness or guarding; bowel sounds normal  Extremities: WWP; no edema or cyanosis; radial/pedal pulses palpable  Neurological:  CNII-XII grossly intact; no obvious focal deficits  Skin: No rashes noted  Vasc: +2 DP/PT pulses b/l   Psych: Appropriate Affect    MEDICATIONS  (STANDING):  acetaminophen     Tablet .. 1000 milliGRAM(s) Oral every 8 hours  amLODIPine   Tablet 5 milliGRAM(s) Oral daily  enoxaparin Injectable 40 milliGRAM(s) SubCutaneous every 24 hours  gabapentin 400 milliGRAM(s) Oral every 8 hours  glucagon  Injectable 1 milliGRAM(s) IntraMuscular Once  losartan 100 milliGRAM(s) Oral daily  methocarbamol 750 milliGRAM(s) Oral every 8 hours  metoprolol succinate  milliGRAM(s) Oral daily  polyethylene glycol 3350 17 Gram(s) Oral daily  senna 2 Tablet(s) Oral at bedtime    MEDICATIONS  (PRN):  albuterol    90 MICROgram(s) HFA Inhaler 2 Puff(s) Inhalation daily PRN Bronchospasm  oxyCODONE    IR 5 milliGRAM(s) Oral every 4 hours PRN Severe Pain (7 - 10)      Compazine (Other)  penicillin (Other)      LABS                        9.4    7.33  )-----------( 279      ( 14 Apr 2023 06:59 )             29.2     04-14    138  |  105  |  10  ----------------------------<  97  4.2   |  26  |  0.60    Ca    8.6      14 Apr 2023 06:59  Phos  3.2     04-14  Mg     1.9     04-14                IMAGING/EKG/ETC: reviewed

## 2023-04-14 NOTE — PROGRESS NOTE ADULT - SUBJECTIVE AND OBJECTIVE BOX
HPI:  57F diagnosed with severe L4-S1 b/I foraminal stenosis with L4-5 spondylolisthesis. Past medical history DM, HT who presents today for elective L4-5 fusion. Ms. Ramon states that she has suffered from chronic back pain for several years. She states her symptoms have progressed within the past year. She describes LBP radiating to RLE with intermittent numbness/tingling. Pain/numbness/tingling involves the entire leg to the foot. She also reports RLE weakness. She has done physical therapy for several years which initially helped with symptoms but now is ineffective. She also reports several injections by pain management over the past year which have been ineffective. 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 She can only walk several city blocks before sitting down to rest due to pain. At present, she states her low back pain and leg pain is greatly affecting her quality of life.     (2023 06:06)    Hospital Course:  : POD 0 L4-5 laminectomy and fusion. Pt slower to wake up from anesthesia and complaining of 10/10 back pain. Pt received Dilaudid 0.5 for pain.   : POD1 L4-5 lami/fusion. Pain better controlled overnight. Neuro exam stable. LSO delivered, hunt d'marlon today, passed TOV. SQL tonight. lyrica changed to gabapentin 300 tid  : POD2 L4-5 lami/fusion. BREONNA overnight. Neuro exam stable.    Vital Signs Last 24 Hrs  T(C): 36.8 (2023 21:12), Max: 36.8 (2023 21:12)  T(F): 98.3 (2023 21:12), Max: 98.3 (2023 21:12)  HR: 75 (2023 21:12) (62 - 77)  BP: 115/79 (2023 21:12) (111/73 - 151/85)  BP(mean): --  RR: 16 (2023 21:12) (16 - 18)  SpO2: 98% (2023 21:12) (97% - 99%)    Parameters below as of 2023 21:12  Patient On (Oxygen Delivery Method): room air        I&O's Summary    2023 07:01  -  2023 07:00  --------------------------------------------------------  IN: 1150 mL / OUT: 400 mL / NET: 750 mL    2023 07:01  -  2023 00:16  --------------------------------------------------------  IN: 0 mL / OUT: 460 mL / NET: -460 mL      PHYSICAL EXAM:  General: Pt is laying in hospital bed,NAD  Cardiovascular: RRR, normal S1 and S2   Respiratory: non-labored breathing, symmetric chest rise, CTA b/l  GI: abd soft, NTND   Neuro: A&O x 3, no aphasia, speech clear and fluent  CNII-XII: EOM intact, PERRL, face symmetric  Motor: UE 5/5 throughout B/L, B/L 4/5 HF (limited 2/2 pain,) otherwise 4+/5 throughout   Wounds: lumbar incision site with gauze dressing in place C/D/I    TUBES/LINES:  [] Hunt  [] Lumbar Drain  [x] Wound Drains: one deep HMV to full suction draining sanguinous fluid  [] Others    DIET:  [] NPO  [x] Mechanical  [] Tube feeds    LABS:                        9.6    7.79  )-----------( 273      ( 2023 05:30 )             29.1     04-13    137  |  107  |  8   ----------------------------<  148<H>  3.5   |  25  |  0.51    Ca    8.3<L>      2023 05:30  Phos  3.5     04-13  Mg     1.5     04-13              CAPILLARY BLOOD GLUCOSE      POCT Blood Glucose.: 106 mg/dL (2023 21:56)  POCT Blood Glucose.: 115 mg/dL (2023 17:51)  POCT Blood Glucose.: 96 mg/dL (2023 12:46)  POCT Blood Glucose.: 83 mg/dL (2023 11:37)  POCT Blood Glucose.: 118 mg/dL (2023 07:02)      Drug Levels: [] N/A    CSF Analysis: [] N/A      Allergies    Compazine (Other)  penicillin (Other)    Intolerances      MEDICATIONS:  Antibiotics:    Neuro:  acetaminophen     Tablet .. 1000 milliGRAM(s) Oral every 8 hours  gabapentin 300 milliGRAM(s) Oral three times a day  methocarbamol 500 milliGRAM(s) Oral every 8 hours  oxyCODONE    IR 5 milliGRAM(s) Oral every 4 hours PRN    Anticoagulation:  enoxaparin Injectable 40 milliGRAM(s) SubCutaneous every 24 hours    OTHER:  albuterol    90 MICROgram(s) HFA Inhaler 2 Puff(s) Inhalation daily PRN  amLODIPine   Tablet 5 milliGRAM(s) Oral daily  dextrose 50% Injectable 25 Gram(s) IV Push once  dextrose 50% Injectable 12.5 Gram(s) IV Push once  dextrose 50% Injectable 25 Gram(s) IV Push once  dextrose Oral Gel 15 Gram(s) Oral once PRN  glucagon  Injectable 1 milliGRAM(s) IntraMuscular Once  insulin lispro (ADMELOG) corrective regimen sliding scale   SubCutaneous Before meals and at bedtime  losartan 100 milliGRAM(s) Oral daily  metoprolol succinate  milliGRAM(s) Oral daily  polyethylene glycol 3350 17 Gram(s) Oral daily  senna 2 Tablet(s) Oral at bedtime    IVF:  dextrose 5%. 1000 milliLiter(s) IV Continuous <Continuous>  dextrose 5%. 1000 milliLiter(s) IV Continuous <Continuous>    CULTURES:    RADIOLOGY & ADDITIONAL TESTS:      ASSESSMENT:  Pt is a 57F w/ PMHx of DM and chronic back pain found to have severe, B/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 Pt is now s/p L4-5 laminectomy with fusion (23).    M53.2X6    FH ischemic heart disease (Mother)    Handoff    MEWS Score    Lumbar radiculopathy    HTN (hypertension)    Asthma    Chronic sinusitis    Type 2 diabetes mellitus    History of palpitations    Lumbar radiculopathy    Lumbar radiculopathy    Lumbar radiculopathy    Type 2 diabetes mellitus    HTN (hypertension)    Asthma    Anemia due to acute blood loss    Hypocalcemia    Hypomagnesemia    Fusion of lumbar spine using posterior oblique technique    S/P     H/O knee surgery    SysAdmin_VstLnk      PLAN:  Neuro:  - Neuro/vital checks q4hr  - Pain management with Tylenol 1g, Oxy 5mg prin, gabapentin 300 tid, robaxin 500q8  - HMV drain (deep)- monitor output  - standing xrays prior to discharge  - LSO brace when OOB    Cardio:  - SBP goal   - Continue home Metoprolol and Amlodipine     Pulm:  - Incentive spirometry   - NC PRN  - Albuterol PRN    GI:  - Consistent carb diet   - Bowel regimen    Endo:  - ISS  - F/U A1c    Renal:  - IVL  - voiding  - Electrolytes prn    Heme:  - SCDs for DVT prophylaxis, SQL     ID  - Post-op Ancef     Discussed with Dr. Penny

## 2023-04-14 NOTE — DISCHARGE NOTE PROVIDER - NSDCCPCAREPLAN_GEN_ALL_CORE_FT
PRINCIPAL DISCHARGE DIAGNOSIS  Diagnosis: Lumbar radiculopathy  Assessment and Plan of Treatment:       SECONDARY DISCHARGE DIAGNOSES  Diagnosis: HTN (hypertension)  Assessment and Plan of Treatment:     Diagnosis: Asthma  Assessment and Plan of Treatment:     Diagnosis: Type 2 diabetes mellitus  Assessment and Plan of Treatment:      PRINCIPAL DISCHARGE DIAGNOSIS  Diagnosis: Lumbar radiculopathy  Assessment and Plan of Treatment:       SECONDARY DISCHARGE DIAGNOSES  Diagnosis: HTN (hypertension)  Assessment and Plan of Treatment: continue home Amlodpine, Losartan, Metoprolol    Diagnosis: Asthma  Assessment and Plan of Treatment: continue home Albuterol PRN    Diagnosis: Type 2 diabetes mellitus  Assessment and Plan of Treatment: continue home Metformin, Rybelsus

## 2023-04-14 NOTE — DISCHARGE NOTE PROVIDER - CARE PROVIDER_API CALL
Kai Penny)  Neurosurgery  [address], [place] [facility]  Phone: [phone]  Fax: [phone]  Follow Up Time:

## 2023-04-14 NOTE — DISCHARGE NOTE PROVIDER - NSDCMRMEDTOKEN_GEN_ALL_CORE_FT
Albuterol (Eqv-ProAir HFA) 90 mcg/inh inhalation aerosol: 2 inhaled prn  amLODIPine 5 mg oral tablet: 1 orally once a day  gabapentin 300 mg oral tablet: orally 3 times a day  losartan 100 mg oral tablet: 1 orally once a day  metFORMIN 500 mg oral tablet: 1 orally once a day  metoprolol succinate 200 mg oral capsule, extended release: 1 orally once a day  Rybelsus 7 mg oral tablet: 1 orally once a day   acetaminophen 500 mg oral tablet: 2 tab(s) orally every 8 hours as needed for mild to moderate pain  Albuterol (Eqv-ProAir HFA) 90 mcg/inh inhalation aerosol: 2 inhaled prn  amLODIPine 5 mg oral tablet: 1 orally once a day  enoxaparin: 40 milligram(s) subcutaneous once a day while at rehab for DVT prevention  gabapentin 400 mg oral capsule: 1 cap(s) orally every 8 hours  losartan 100 mg oral tablet: 1 orally once a day  metFORMIN 500 mg oral tablet: 1 orally once a day  methocarbamol 750 mg oral tablet: 1 tab(s) orally every 8 hours as needed for  muscle spasm  metoprolol succinate 200 mg oral capsule, extended release: 1 orally once a day  oxyCODONE 5 mg oral tablet: 1 tab(s) orally every 4 hours As needed Severe Pain (7 - 10)  polyethylene glycol 3350 oral powder for reconstitution: 17 gram(s) orally once a day  Rybelsus 7 mg oral tablet: 1 orally once a day  senna leaf extract oral tablet: 2 tab(s) orally once a day (at bedtime)

## 2023-04-14 NOTE — DISCHARGE NOTE PROVIDER - NSDCCPTREATMENT_GEN_ALL_CORE_FT
PRINCIPAL PROCEDURE  Procedure: Fusion of lumbar spine using posterior oblique technique  Findings and Treatment: L4-5

## 2023-04-14 NOTE — DISCHARGE NOTE PROVIDER - NSDCFUADDINST_GEN_ALL_CORE_FT
Neurosurgery follow up appointment date/time:  - are staples/sutures in place?  - what day should staples/sutures be removed (POD 10-14)?  - please call the office to confirm appointment: [phone]    Wound Care:  - can patient shower?  - does dressing need to be changed/removed?  - pressure ulcer?     Devices:  - does patient need collar or brace or helmet?   - does collar/brace need to be worn at all times or just when OOB?  - RW or cane for ambulation?    Drains/Lines:  - PICC in place? ID follow up? (Paper Rx for: antibiotics, heparin flush, weekly lab draws)  - KAT in place? Management?  - hunt in place? Management/Urology follow up?  - Tracheostomy?  - PEG tube?      Activity:  - fatigue is common after surgery, rest if you feel tired   - no bending, lifting, twisting or heavy lifting   - walking is recommended, ambulate as tolerated  - you may shower when you get home, keep your incision dry  - no soaking in a tub/pool/hot tub   - no driving within 24 hours of anesthesia or while taking prescription pain medications   - keep hydrated, drink plenty of water     Inpatient consults:    Please also follow up with your primary care doctor.     Pain Expectations:  - pain after surgery is expected  - please take pain meds as prescribed     Medications:  - changes to home meds (ex. AED's)?  - new meds?  - pain meds?  - when can antiplatelets or anticoagulants be restarted?  - were adverse affects of meds discussed with patients?   - pain medications can cause constipation, you should eat a high fiber diet and may take a stool softener while on pain meds   - Avoid taking Advil (ibuprofen), Motrin (naproxen), or Aspirin for pain as they can cause bleeding     Call the office or come to ED if:  - wound has drainage or bleeding, increased redness or pain at incision site, neurological change, fever (>101), chills, night sweats, syncope, nausea/vomiting      Playback:  - See [facility] for a copy of your discharge paperwork     WITHIN 24 HOURS OF DISCHARGE, PLEASE CONTACT NEURO PA  WITH ANY QUESTIONS OR CONCERNS: [phone]   OTHERWISE, PLEASE CALL THE OFFICE WITH ANY QUESTIONS OR CONCERNS: [phone] Neurosurgery follow up appointment date/time:  - follow up in the office for a wound check   - please call the office to confirm appointment: [phone]    Wound Care:  - shower daily and let soapy water run down your back   - pat dry incision after showering   - leave incision uncovered, open to air   - no picking at incision     Activity:  - fatigue is common after surgery, rest if you feel tired   - no bending, lifting, twisting or heavy lifting   - walking is recommended, ambulate as tolerated  - you may shower when you get home, keep your incision dry  - no soaking in a tub/pool/hot tub   - no driving within 24 hours of anesthesia or while taking prescription pain medications   - keep hydrated, drink plenty of water     Inpatient consults:    Please also follow up with your primary care doctor.     Pain Expectations:  - pain after surgery is expected  - please take pain meds as prescribed     Medications:  - changes to home meds (ex. AED's)?  - new meds?  - pain meds?  - pain medications can cause constipation, you should eat a high fiber diet and may take a stool softener while on pain meds   - Avoid taking Advil (ibuprofen), Motrin (naproxen), or Aspirin for pain as they can cause bleeding     Call the office or come to ED if:  - wound has drainage or bleeding, increased redness or pain at incision site, neurological change, fever (>101), chills, night sweats, syncope, nausea/vomiting      Playback:  - See [facility] for a copy of your discharge paperwork     WITHIN 24 HOURS OF DISCHARGE, PLEASE CONTACT NEURO PA  WITH ANY QUESTIONS OR CONCERNS: [phone]   OTHERWISE, PLEASE CALL THE OFFICE WITH ANY QUESTIONS OR CONCERNS: [phone] Neurosurgery follow up appointment date/time:  - follow up in the office for a wound check   - please call the office to confirm appointment: [phone]    Wound Care:  - shower daily and let soapy water run down your back   - pat dry incision after showering   - leave incision uncovered, open to air   - no picking at incision     Activity:  - fatigue is common after surgery, rest if you feel tired   - no bending, lifting, twisting or heavy lifting   - walking is recommended, ambulate as tolerated  - you may shower when you get home, keep your incision dry  - no soaking in a tub/pool/hot tub   - no driving within 24 hours of anesthesia or while taking prescription pain medications   - keep hydrated, drink plenty of water     Please also follow up with your primary care doctor.     Pain Expectations:  - pain after surgery is expected  - please take pain meds as prescribed     AT REHAB: monitor sodium at rehab for a normal sodium goal (135-145), Na 134 on 4/18/23.       Medications:  - Home Gabapentin dose increased to 400mg every 8 hours  - otherwise continue home meds as prescribed: Albuterol PRN, Amlodipine, Losartan, Metformin, Metoprolol, Rybelsus   - new meds: Lovenox 40mg daily while at rehab for DVT prevention  - pain meds: Tylenol 500mg every 6 hours as needed for mild to moderate pain, Gabapentin 400mg every 8 hours for nerve pain, Robaxin 750mg every 8 hours as needed for muscle spasm, Oxycodone 5mg every 4 hours as needed for severe pain   - pain medications can cause constipation, you should eat a high fiber diet and may take a stool softener while on pain meds   - Avoid taking Advil (ibuprofen), Motrin (naproxen), or Aspirin for pain as they can cause bleeding     Call the office or come to ED if:  - wound has drainage or bleeding, increased redness or pain at incision site, neurological change, fever (>101), chills, night sweats, syncope, nausea/vomiting      Playback:  - See [facility] for a copy of your discharge paperwork     WITHIN 24 HOURS OF DISCHARGE, PLEASE CONTACT NEURO PA  WITH ANY QUESTIONS OR CONCERNS: [phone]   OTHERWISE, PLEASE CALL THE OFFICE WITH ANY QUESTIONS OR CONCERNS: [phone]

## 2023-04-14 NOTE — DISCHARGE NOTE PROVIDER - NSDCFUADDAPPT_GEN_ALL_CORE_FT
Please follow up with Dr. Penny    Please follow up with your primary care doctor  Please follow up with Dr. Penny, call the office to make/confirm appointment at [phone]    Please follow up with your primary care doctor

## 2023-04-15 LAB
ANION GAP SERPL CALC-SCNC: 6 MMOL/L — SIGNIFICANT CHANGE UP (ref 5–17)
BASOPHILS # BLD AUTO: 0.03 K/UL — SIGNIFICANT CHANGE UP (ref 0–0.2)
BASOPHILS NFR BLD AUTO: 0.4 % — SIGNIFICANT CHANGE UP (ref 0–2)
BUN SERPL-MCNC: 10 MG/DL — SIGNIFICANT CHANGE UP (ref 7–23)
CALCIUM SERPL-MCNC: 8.8 MG/DL — SIGNIFICANT CHANGE UP (ref 8.4–10.5)
CHLORIDE SERPL-SCNC: 103 MMOL/L — SIGNIFICANT CHANGE UP (ref 96–108)
CO2 SERPL-SCNC: 28 MMOL/L — SIGNIFICANT CHANGE UP (ref 22–31)
CREAT SERPL-MCNC: 0.63 MG/DL — SIGNIFICANT CHANGE UP (ref 0.5–1.3)
EGFR: 105 ML/MIN/1.73M2 — SIGNIFICANT CHANGE UP
EOSINOPHIL # BLD AUTO: 0.05 K/UL — SIGNIFICANT CHANGE UP (ref 0–0.5)
EOSINOPHIL NFR BLD AUTO: 0.7 % — SIGNIFICANT CHANGE UP (ref 0–6)
GLUCOSE SERPL-MCNC: 96 MG/DL — SIGNIFICANT CHANGE UP (ref 70–99)
HCT VFR BLD CALC: 30.8 % — LOW (ref 34.5–45)
HGB BLD-MCNC: 9.9 G/DL — LOW (ref 11.5–15.5)
IMM GRANULOCYTES NFR BLD AUTO: 0.3 % — SIGNIFICANT CHANGE UP (ref 0–0.9)
LYMPHOCYTES # BLD AUTO: 2.81 K/UL — SIGNIFICANT CHANGE UP (ref 1–3.3)
LYMPHOCYTES # BLD AUTO: 41.8 % — SIGNIFICANT CHANGE UP (ref 13–44)
MAGNESIUM SERPL-MCNC: 1.7 MG/DL — SIGNIFICANT CHANGE UP (ref 1.6–2.6)
MCHC RBC-ENTMCNC: 26.5 PG — LOW (ref 27–34)
MCHC RBC-ENTMCNC: 32.1 GM/DL — SIGNIFICANT CHANGE UP (ref 32–36)
MCV RBC AUTO: 82.6 FL — SIGNIFICANT CHANGE UP (ref 80–100)
MONOCYTES # BLD AUTO: 0.43 K/UL — SIGNIFICANT CHANGE UP (ref 0–0.9)
MONOCYTES NFR BLD AUTO: 6.4 % — SIGNIFICANT CHANGE UP (ref 2–14)
NEUTROPHILS # BLD AUTO: 3.39 K/UL — SIGNIFICANT CHANGE UP (ref 1.8–7.4)
NEUTROPHILS NFR BLD AUTO: 50.4 % — SIGNIFICANT CHANGE UP (ref 43–77)
NRBC # BLD: 0 /100 WBCS — SIGNIFICANT CHANGE UP (ref 0–0)
PHOSPHATE SERPL-MCNC: 4.2 MG/DL — SIGNIFICANT CHANGE UP (ref 2.5–4.5)
PLATELET # BLD AUTO: 288 K/UL — SIGNIFICANT CHANGE UP (ref 150–400)
POTASSIUM SERPL-MCNC: 4.1 MMOL/L — SIGNIFICANT CHANGE UP (ref 3.5–5.3)
POTASSIUM SERPL-SCNC: 4.1 MMOL/L — SIGNIFICANT CHANGE UP (ref 3.5–5.3)
RBC # BLD: 3.73 M/UL — LOW (ref 3.8–5.2)
RBC # FLD: 16.1 % — HIGH (ref 10.3–14.5)
SODIUM SERPL-SCNC: 137 MMOL/L — SIGNIFICANT CHANGE UP (ref 135–145)
WBC # BLD: 6.73 K/UL — SIGNIFICANT CHANGE UP (ref 3.8–10.5)
WBC # FLD AUTO: 6.73 K/UL — SIGNIFICANT CHANGE UP (ref 3.8–10.5)

## 2023-04-15 PROCEDURE — 99232 SBSQ HOSP IP/OBS MODERATE 35: CPT

## 2023-04-15 RX ORDER — MAGNESIUM SULFATE 500 MG/ML
2 VIAL (ML) INJECTION ONCE
Refills: 0 | Status: COMPLETED | OUTPATIENT
Start: 2023-04-15 | End: 2023-04-15

## 2023-04-15 RX ADMIN — Medication 200 MILLIGRAM(S): at 05:11

## 2023-04-15 RX ADMIN — SENNA PLUS 2 TABLET(S): 8.6 TABLET ORAL at 22:24

## 2023-04-15 RX ADMIN — POLYETHYLENE GLYCOL 3350 17 GRAM(S): 17 POWDER, FOR SOLUTION ORAL at 11:55

## 2023-04-15 RX ADMIN — Medication 25 GRAM(S): at 11:55

## 2023-04-15 RX ADMIN — GABAPENTIN 400 MILLIGRAM(S): 400 CAPSULE ORAL at 14:52

## 2023-04-15 RX ADMIN — METHOCARBAMOL 750 MILLIGRAM(S): 500 TABLET, FILM COATED ORAL at 14:52

## 2023-04-15 RX ADMIN — GABAPENTIN 400 MILLIGRAM(S): 400 CAPSULE ORAL at 22:24

## 2023-04-15 RX ADMIN — Medication 1000 MILLIGRAM(S): at 23:24

## 2023-04-15 RX ADMIN — METHOCARBAMOL 750 MILLIGRAM(S): 500 TABLET, FILM COATED ORAL at 05:11

## 2023-04-15 RX ADMIN — Medication 1000 MILLIGRAM(S): at 22:24

## 2023-04-15 RX ADMIN — ENOXAPARIN SODIUM 40 MILLIGRAM(S): 100 INJECTION SUBCUTANEOUS at 22:24

## 2023-04-15 RX ADMIN — Medication 1000 MILLIGRAM(S): at 05:10

## 2023-04-15 RX ADMIN — METHOCARBAMOL 750 MILLIGRAM(S): 500 TABLET, FILM COATED ORAL at 22:24

## 2023-04-15 RX ADMIN — Medication 1000 MILLIGRAM(S): at 06:10

## 2023-04-15 RX ADMIN — GABAPENTIN 400 MILLIGRAM(S): 400 CAPSULE ORAL at 05:11

## 2023-04-15 RX ADMIN — AMLODIPINE BESYLATE 5 MILLIGRAM(S): 2.5 TABLET ORAL at 05:11

## 2023-04-15 RX ADMIN — OXYCODONE HYDROCHLORIDE 5 MILLIGRAM(S): 5 TABLET ORAL at 12:20

## 2023-04-15 RX ADMIN — Medication 1000 MILLIGRAM(S): at 14:53

## 2023-04-15 RX ADMIN — OXYCODONE HYDROCHLORIDE 5 MILLIGRAM(S): 5 TABLET ORAL at 13:20

## 2023-04-15 NOTE — PROGRESS NOTE ADULT - SUBJECTIVE AND OBJECTIVE BOX
HPI:  57F diagnosed with severe L4-S1 b/I foraminal stenosis with L4-5 spondylolisthesis. Past medical history DM, HT who presents today for elective L4-5 fusion. Ms. Ramon states that she has suffered from chronic back pain for several years. She states her symptoms have progressed within the past year. She describes LBP radiating to RLE with intermittent numbness/tingling. Pain/numbness/tingling involves the entire leg to the foot. She also reports RLE weakness. She has done physical therapy for several years which initially helped with symptoms but now is ineffective. She also reports several injections by pain management over the past year which have been ineffective. 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 She can only walk several city blocks before sitting down to rest due to pain. At present, she states her low back pain and leg pain is greatly affecting her quality of life.     (2023 06:06)    Hospital Course:  : POD 0 L4-5 laminectomy and fusion. Pt slower to wake up from anesthesia and complaining of 10/10 back pain. Pt received Dilaudid 0.5 for pain.   : POD1 L4-5 lami/fusion. Pain better controlled overnight. Neuro exam stable. LSO delivered, marilee vela'marlon today, passed TOV. SQL tonight. lyrica changed to gabapentin 300 tid  : POD2 L4-5 lami/fusion. BREONNA overnight. Neuro exam stable.  4/15: POD#3 L4-5 laminectomy and posterior fusion. Admits that pain responds well to present regimen. Hemovac in place.  Worked with PT who recommended acute rehab.    OVERNIGHT EVENTS: No major events overnight.    Vital Signs Last 24 Hrs  T(C): 36.8 (2023 20:53), Max: 36.8 (2023 08:56)  T(F): 98.2 (2023 20:53), Max: 98.3 (2023 16:07)  HR: 71 (2023 20:53) (68 - 77)  BP: 129/83 (2023 20:53) (113/73 - 129/83)  BP(mean): 98 (2023 08:56) (98 - 98)  RR: 16 (2023 20:53) (16 - 17)  SpO2: 95% (2023 20:53) (95% - 98%)    Parameters below as of 2023 20:53  Patient On (Oxygen Delivery Method): room air        I&O's Summary    2023 07:01  -  2023 07:00  --------------------------------------------------------  IN: 0 mL / OUT: 585 mL / NET: -585 mL    2023 07:01  -  15 2023 00:46  --------------------------------------------------------  IN: 0 mL / OUT: 90 mL / NET: -90 mL        PHYSICAL EXAM:  General: Pt is laying in hospital bed,NAD  Cardiovascular: RRR, normal S1 and S2   Respiratory: non-labored breathing, symmetric chest rise, CTA b/l  GI: abd soft, NTND   Neuro: A&O x 3, no aphasia, speech clear and fluent  CNII-XII: EOM intact, PERRL, face symmetric  Motor: UE 5/5 throughout B/L, B/L 4/5 HF (limited 2/2 pain,) otherwise 4+/5 throughout   Wounds: lumbar incision site with gauze dressing in place C/D/I    TUBES/LINES:  Hemovac to full suction.    DIET: diabetic diet.    LABS:                        9.4    7.33  )-----------( 279      ( 2023 06:59 )             29.2     04-14    138  |  105  |  10  ----------------------------<  97  4.2   |  26  |  0.60    Ca    8.6      2023 06:59  Phos  3.2     04-14  Mg     1.9     04-14              CAPILLARY BLOOD GLUCOSE          Drug Levels: [] N/A    CSF Analysis: [] N/A      Allergies    Compazine (Other)  penicillin (Other)    Intolerances      MEDICATIONS:  Antibiotics:    Neuro:  acetaminophen     Tablet .. 1000 milliGRAM(s) Oral every 8 hours  gabapentin 400 milliGRAM(s) Oral every 8 hours  methocarbamol 750 milliGRAM(s) Oral every 8 hours  oxyCODONE    IR 5 milliGRAM(s) Oral every 4 hours PRN    Anticoagulation:  enoxaparin Injectable 40 milliGRAM(s) SubCutaneous every 24 hours    OTHER:  albuterol    90 MICROgram(s) HFA Inhaler 2 Puff(s) Inhalation daily PRN  amLODIPine   Tablet 5 milliGRAM(s) Oral daily  glucagon  Injectable 1 milliGRAM(s) IntraMuscular Once  losartan 100 milliGRAM(s) Oral daily  metoprolol succinate  milliGRAM(s) Oral daily  polyethylene glycol 3350 17 Gram(s) Oral daily  senna 2 Tablet(s) Oral at bedtime    IVF:    CULTURES:    RADIOLOGY & ADDITIONAL TESTS:      ASSESSMENT:  Pt is a 57F w/ PMHx of DM and chronic back pain found to have severe, B/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 Pt is now s/p L4-5 laminectomy with fusion (23).      M53.2X6    FH ischemic heart disease (Mother)    Handoff    MEWS Score    Lumbar radiculopathy    HTN (hypertension)    Asthma    Chronic sinusitis    Type 2 diabetes mellitus    History of palpitations    Lumbar radiculopathy    Lumbar radiculopathy    Fusion of lumbar spine using posterior oblique technique    Lumbar radiculopathy    Lumbar radiculopathy    Type 2 diabetes mellitus    HTN (hypertension)    Asthma    Anemia due to acute blood loss    Hypocalcemia    Hypomagnesemia    Fusion of lumbar spine using posterior oblique technique    S/P     H/O knee surgery    Type 2 diabetes mellitus    HTN (hypertension)    Asthma    [email or web address]        PLAN:  Neuro:  - Neuro/vital checks q4hr  - Pain management with Tylenol 1g, Oxy 5mg prin, gabapentin 300 tid, robaxin 500q8  - HMV drain (deep)- monitor output  - standing xrays prior to discharge  - LSO brace when OOB    Cardio:  - SBP goal   - Continue home Metoprolol and Amlodipine     Pulm:  - Incentive spirometry   - NC PRN  - Albuterol PRN    GI:  - Consistent carb diet   - Bowel regimen    Endo:  - ISS  - F/U A1c    Renal:  - IVL  - voiding  - Electrolytes prn    Heme:  - SCDs for DVT prophylaxis, SQL     ID  - Post-op Ancef     DVT PROPHYLAXIS:  [] Venodynes                                [] Heparin/Lovenox    DISPOSITION: Acute rebah.    Assessment:  Present when checked    []  GCS  E   V  M     Heart Failure: []Acute, [] acute on chronic , []chronic  Heart Failure:  [] Diastolic (HFpEF), [] Systolic (HFrEF), []Combined (HFpEF and HFrEF), [] RHF, [] Pulm HTN, [] Other    [] DEN, [] ATN, [] AIN, [] other  [] CKD1, [] CKD2, [] CKD 3, [] CKD 4, [] CKD 5, []ESRD    Encephalopathy: [] Metabolic, [] Hepatic, [] toxic, [] Neurological, [] Other    Abnormal Nurtitional Status: [] malnurtition (see nutrition note), [ ]underweight: BMI < 19, [] morbid obesity: BMI >40, [] Cachexia    [] Sepsis  [] hypovolemic shock,[] cardiogenic shock, [] hemorrhagic shock, [] neuogenic shock  [] Acute Respiratory Failure  []Cerebral edema, [] Brain compression/ herniation,   [] Functional quadriplegia  [] Acute blood loss anemia

## 2023-04-15 NOTE — PROGRESS NOTE ADULT - SUBJECTIVE AND OBJECTIVE BOX
PROGRESS NOTE:   Authoted by Dr. Anish Morris MD, HERMILA  Available via [employer] Teams     Patient is a 57y old  Female who presents with a chief complaint of L4-5 posterior laminectomy and instrumented fusion (15 Apr 2023 00:42)    SUBJECTIVE / OVERNIGHT EVENTS:  No acute events overnight   No subjective complaints    MEDICATIONS  (STANDING):  acetaminophen     Tablet .. 1000 milliGRAM(s) Oral every 8 hours  amLODIPine   Tablet 5 milliGRAM(s) Oral daily  enoxaparin Injectable 40 milliGRAM(s) SubCutaneous every 24 hours  gabapentin 400 milliGRAM(s) Oral every 8 hours  glucagon  Injectable 1 milliGRAM(s) IntraMuscular Once  losartan 100 milliGRAM(s) Oral daily  methocarbamol 750 milliGRAM(s) Oral every 8 hours  metoprolol succinate  milliGRAM(s) Oral daily  polyethylene glycol 3350 17 Gram(s) Oral daily  senna 2 Tablet(s) Oral at bedtime    MEDICATIONS  (PRN):  albuterol    90 MICROgram(s) HFA Inhaler 2 Puff(s) Inhalation daily PRN Bronchospasm  oxyCODONE    IR 5 milliGRAM(s) Oral every 4 hours PRN Severe Pain (7 - 10)    OBJECTIVE:  Vital Signs Last 24 Hrs  T(C): 36.8 (15 Apr 2023 08:46), Max: 36.8 (14 Apr 2023 16:07)  T(F): 98.3 (15 Apr 2023 08:46), Max: 98.3 (14 Apr 2023 16:07)  HR: 72 (15 Apr 2023 08:46) (70 - 77)  BP: 104/72 (15 Apr 2023 08:46) (104/72 - 129/83)  BP(mean): 83 (15 Apr 2023 08:46) (83 - 83)  RR: 17 (15 Apr 2023 08:46) (16 - 17)  SpO2: 96% (15 Apr 2023 08:46) (95% - 96%)    Parameters below as of 15 Apr 2023 08:46  Patient On (Oxygen Delivery Method): room air    I&O's Summary    14 Apr 2023 07:01  -  15 Apr 2023 07:00  --------------------------------------------------------  IN: 0 mL / OUT: 115 mL / NET: -115 mL    CONSTITUTIONAL: NAD  HEAD:  Atraumatic, Normocephalic  EYES: EOMI, conjunctiva and sclera clear  ENMT: No tonsillar erythema, exudates, or enlargement; Moist mucous membranes  NECK: Supple, No JVD  NERVOUS SYSTEM: AOX3, motor and sensation grossly intact in b/l UE and b/l LE  PSYCHIATRIC: Appropriate affect and mood  CHEST/LUNG: Clear to auscultation bilaterally; No rales, rhonchi, wheezing, or rubs  HEART: Regular rate and rhythm; No murmurs, rubs, or gallops. No LE edema  ABDOMEN: Soft, Nontender, Nondistended; Bowel sounds present  EXTREMITIES:  2+ Peripheral Pulses, No clubbing, cyanosis  SKIN: No rashes or lesions    LABS:                        9.9    6.73  )-----------( 288      ( 15 Apr 2023 05:30 )             30.8     04-15    137  |  103  |  10  ----------------------------<  96  4.1   |  28  |  0.63    Ca    8.8      15 Apr 2023 05:30  Phos  4.2     04-15  Mg     1.7     04-15   PROGRESS NOTE:   Authoted by Dr. Anish Morris MD, HERMILA  Available via [employer] Teams     Patient is a 57y old  Female who presents with a chief complaint of L4-5 posterior laminectomy and instrumented fusion (15 Apr 2023 00:42)    SUBJECTIVE / OVERNIGHT EVENTS:  No acute events overnight   No subjective complaints    MEDICATIONS  (STANDING):  acetaminophen     Tablet .. 1000 milliGRAM(s) Oral every 8 hours  amLODIPine   Tablet 5 milliGRAM(s) Oral daily  enoxaparin Injectable 40 milliGRAM(s) SubCutaneous every 24 hours  gabapentin 400 milliGRAM(s) Oral every 8 hours  glucagon  Injectable 1 milliGRAM(s) IntraMuscular Once  losartan 100 milliGRAM(s) Oral daily  methocarbamol 750 milliGRAM(s) Oral every 8 hours  metoprolol succinate  milliGRAM(s) Oral daily  polyethylene glycol 3350 17 Gram(s) Oral daily  senna 2 Tablet(s) Oral at bedtime    MEDICATIONS  (PRN):  albuterol    90 MICROgram(s) HFA Inhaler 2 Puff(s) Inhalation daily PRN Bronchospasm  oxyCODONE    IR 5 milliGRAM(s) Oral every 4 hours PRN Severe Pain (7 - 10)    OBJECTIVE:  Vital Signs Last 24 Hrs  T(C): 36.8 (15 Apr 2023 08:46), Max: 36.8 (14 Apr 2023 16:07)  T(F): 98.3 (15 Apr 2023 08:46), Max: 98.3 (14 Apr 2023 16:07)  HR: 72 (15 Apr 2023 08:46) (70 - 77)  BP: 104/72 (15 Apr 2023 08:46) (104/72 - 129/83)  BP(mean): 83 (15 Apr 2023 08:46) (83 - 83)  RR: 17 (15 Apr 2023 08:46) (16 - 17)  SpO2: 96% (15 Apr 2023 08:46) (95% - 96%)    Parameters below as of 15 Apr 2023 08:46  Patient On (Oxygen Delivery Method): room air    I&O's Summary    14 Apr 2023 07:01  -  15 Apr 2023 07:00  --------------------------------------------------------  IN: 0 mL / OUT: 115 mL / NET: -115 mL    CONSTITUTIONAL: NAD  HEAD:  Atraumatic, Normocephalic  EYES: EOMI, conjunctiva and sclera clear  ENMT: No tonsillar erythema, exudates, or enlargement; Moist mucous membranes  NECK: Supple, No JVD  NERVOUS SYSTEM: AOX3, motor and sensation grossly intact in b/l UE and b/l LE  PSYCHIATRIC: Appropriate affect and mood  CHEST/LUNG: Clear to auscultation bilaterally; No rales, rhonchi, wheezing, or rubs  HEART: Regular rate and rhythm; No murmurs, rubs, or gallops. No LE edema  ABDOMEN: Soft, Nontender, Nondistended; Bowel sounds present  EXTREMITIES: WWP, back- dressing c/d/i     LABS:                        9.9    6.73  )-----------( 288      ( 15 Apr 2023 05:30 )             30.8     04-15    137  |  103  |  10  ----------------------------<  96  4.1   |  28  |  0.63    Ca    8.8      15 Apr 2023 05:30  Phos  4.2     04-15  Mg     1.7     04-15

## 2023-04-16 LAB
ANION GAP SERPL CALC-SCNC: 10 MMOL/L — SIGNIFICANT CHANGE UP (ref 5–17)
BUN SERPL-MCNC: 9 MG/DL — SIGNIFICANT CHANGE UP (ref 7–23)
CALCIUM SERPL-MCNC: 9 MG/DL — SIGNIFICANT CHANGE UP (ref 8.4–10.5)
CHLORIDE SERPL-SCNC: 100 MMOL/L — SIGNIFICANT CHANGE UP (ref 96–108)
CO2 SERPL-SCNC: 26 MMOL/L — SIGNIFICANT CHANGE UP (ref 22–31)
CREAT SERPL-MCNC: 0.6 MG/DL — SIGNIFICANT CHANGE UP (ref 0.5–1.3)
EGFR: 106 ML/MIN/1.73M2 — SIGNIFICANT CHANGE UP
GLUCOSE SERPL-MCNC: 129 MG/DL — HIGH (ref 70–99)
HCT VFR BLD CALC: 32.3 % — LOW (ref 34.5–45)
HGB BLD-MCNC: 10.4 G/DL — LOW (ref 11.5–15.5)
MAGNESIUM SERPL-MCNC: 1.9 MG/DL — SIGNIFICANT CHANGE UP (ref 1.6–2.6)
MCHC RBC-ENTMCNC: 26.1 PG — LOW (ref 27–34)
MCHC RBC-ENTMCNC: 32.2 GM/DL — SIGNIFICANT CHANGE UP (ref 32–36)
MCV RBC AUTO: 81 FL — SIGNIFICANT CHANGE UP (ref 80–100)
NRBC # BLD: 0 /100 WBCS — SIGNIFICANT CHANGE UP (ref 0–0)
PHOSPHATE SERPL-MCNC: 3.7 MG/DL — SIGNIFICANT CHANGE UP (ref 2.5–4.5)
PLATELET # BLD AUTO: 314 K/UL — SIGNIFICANT CHANGE UP (ref 150–400)
POTASSIUM SERPL-MCNC: 3.9 MMOL/L — SIGNIFICANT CHANGE UP (ref 3.5–5.3)
POTASSIUM SERPL-SCNC: 3.9 MMOL/L — SIGNIFICANT CHANGE UP (ref 3.5–5.3)
RBC # BLD: 3.99 M/UL — SIGNIFICANT CHANGE UP (ref 3.8–5.2)
RBC # FLD: 15.8 % — HIGH (ref 10.3–14.5)
SODIUM SERPL-SCNC: 136 MMOL/L — SIGNIFICANT CHANGE UP (ref 135–145)
WBC # BLD: 5.67 K/UL — SIGNIFICANT CHANGE UP (ref 3.8–10.5)
WBC # FLD AUTO: 5.67 K/UL — SIGNIFICANT CHANGE UP (ref 3.8–10.5)

## 2023-04-16 PROCEDURE — 99232 SBSQ HOSP IP/OBS MODERATE 35: CPT

## 2023-04-16 RX ADMIN — Medication 1000 MILLIGRAM(S): at 13:08

## 2023-04-16 RX ADMIN — OXYCODONE HYDROCHLORIDE 5 MILLIGRAM(S): 5 TABLET ORAL at 07:13

## 2023-04-16 RX ADMIN — OXYCODONE HYDROCHLORIDE 5 MILLIGRAM(S): 5 TABLET ORAL at 06:14

## 2023-04-16 RX ADMIN — OXYCODONE HYDROCHLORIDE 5 MILLIGRAM(S): 5 TABLET ORAL at 16:40

## 2023-04-16 RX ADMIN — GABAPENTIN 400 MILLIGRAM(S): 400 CAPSULE ORAL at 13:08

## 2023-04-16 RX ADMIN — GABAPENTIN 400 MILLIGRAM(S): 400 CAPSULE ORAL at 06:13

## 2023-04-16 RX ADMIN — Medication 1000 MILLIGRAM(S): at 22:38

## 2023-04-16 RX ADMIN — ENOXAPARIN SODIUM 40 MILLIGRAM(S): 100 INJECTION SUBCUTANEOUS at 21:39

## 2023-04-16 RX ADMIN — METHOCARBAMOL 750 MILLIGRAM(S): 500 TABLET, FILM COATED ORAL at 13:08

## 2023-04-16 RX ADMIN — SENNA PLUS 2 TABLET(S): 8.6 TABLET ORAL at 21:39

## 2023-04-16 RX ADMIN — Medication 1000 MILLIGRAM(S): at 06:13

## 2023-04-16 RX ADMIN — Medication 1000 MILLIGRAM(S): at 07:13

## 2023-04-16 RX ADMIN — GABAPENTIN 400 MILLIGRAM(S): 400 CAPSULE ORAL at 21:38

## 2023-04-16 RX ADMIN — Medication 1000 MILLIGRAM(S): at 21:38

## 2023-04-16 RX ADMIN — METHOCARBAMOL 750 MILLIGRAM(S): 500 TABLET, FILM COATED ORAL at 06:13

## 2023-04-16 RX ADMIN — OXYCODONE HYDROCHLORIDE 5 MILLIGRAM(S): 5 TABLET ORAL at 15:40

## 2023-04-16 RX ADMIN — POLYETHYLENE GLYCOL 3350 17 GRAM(S): 17 POWDER, FOR SOLUTION ORAL at 13:08

## 2023-04-16 RX ADMIN — METHOCARBAMOL 750 MILLIGRAM(S): 500 TABLET, FILM COATED ORAL at 21:38

## 2023-04-16 NOTE — PROGRESS NOTE ADULT - SUBJECTIVE AND OBJECTIVE BOX
SUBJECTIVE: Patient denies new symptoms at this time.     HOSPITAL COURSE:  4/12: POD 0 L4-5 laminectomy and fusion. Pt slower to wake up from anesthesia and complaining of 10/10 back pain. Pt received Dilaudid 0.5 for pain.   4/13: POD1 L4-5 lami/fusion. Pain better controlled overnight. Neuro exam stable. LSO delivered, marilee mcallister today, passed TOV. SQL tonight. lyrica changed to gabapentin 300 tid  4/14: POD2 L4-5 lami/fusion. BREONNA overnight. Neuro exam stable. gabapentin increased to 400 tid. post op xrays completed   4/15: POD 3 L4-5 lami/fusion   4/16: POD 4 L4-5 lami/fusion     Vital Signs Last 24 Hrs  T(C): 36.5 (15 Apr 2023 21:12), Max: 36.9 (15 Apr 2023 16:08)  T(F): 97.7 (15 Apr 2023 21:12), Max: 98.4 (15 Apr 2023 16:08)  HR: 71 (15 Apr 2023 21:12) (70 - 78)  BP: 105/66 (15 Apr 2023 21:12) (104/72 - 123/88)  BP(mean): 83 (15 Apr 2023 08:46) (83 - 83)  RR: 16 (15 Apr 2023 21:12) (16 - 17)  SpO2: 96% (15 Apr 2023 21:12) (95% - 97%)    Parameters below as of 15 Apr 2023 21:12  Patient On (Oxygen Delivery Method): room air    I&O's Summary    14 Apr 2023 07:01  -  15 Apr 2023 07:00  --------------------------------------------------------  IN: 0 mL / OUT: 115 mL / NET: -115 mL    15 Apr 2023 07:01  -  16 Apr 2023 02:02  --------------------------------------------------------  IN: 240 mL / OUT: 410 mL / NET: -170 mL    PHYSICAL EXAM:  General: Pt is laying in hospital bed,NAD  Cardiovascular: RRR, normal S1 and S2   Respiratory: non-labored breathing, symmetric chest rise, CTA b/l  GI: abd soft, NTND   Neuro: A&O x 3, no aphasia, speech clear and fluent  CNII-XII: EOM intact, PERRL, face symmetric  Motor: UE 5/5 throughout B/L, B/L 4/5 HF (limited 2/2 pain,) otherwise 4+/5 throughout   Wounds: lumbar incision site with gauze dressing in place C/D/I  Hemovac to full suction.    LABS:                        9.9    6.73  )-----------( 288      ( 15 Apr 2023 05:30 )             30.8     04-15    137  |  103  |  10  ----------------------------<  96  4.1   |  28  |  0.63    Ca    8.8      15 Apr 2023 05:30  Phos  4.2     04-15  Mg     1.7     04-15              CAPILLARY BLOOD GLUCOSE      POCT Blood Glucose.: 136 mg/dL (15 Apr 2023 18:22)      Drug Levels: [] N/A    CSF Analysis: [] N/A      Allergies    Compazine (Other)  penicillin (Other)    Intolerances      MEDICATIONS:  Antibiotics:    Neuro:  acetaminophen     Tablet .. 1000 milliGRAM(s) Oral every 8 hours  gabapentin 400 milliGRAM(s) Oral every 8 hours  methocarbamol 750 milliGRAM(s) Oral every 8 hours  oxyCODONE    IR 5 milliGRAM(s) Oral every 4 hours PRN    Anticoagulation:  enoxaparin Injectable 40 milliGRAM(s) SubCutaneous every 24 hours    OTHER:  albuterol    90 MICROgram(s) HFA Inhaler 2 Puff(s) Inhalation daily PRN  amLODIPine   Tablet 5 milliGRAM(s) Oral daily  glucagon  Injectable 1 milliGRAM(s) IntraMuscular Once  losartan 100 milliGRAM(s) Oral daily  metoprolol succinate  milliGRAM(s) Oral daily  polyethylene glycol 3350 17 Gram(s) Oral daily  senna 2 Tablet(s) Oral at bedtime    IVF:    CULTURES:    RADIOLOGY & ADDITIONAL TESTS:      ASSESSMENT:  Pt is a 57F w/ PMHx of DM and chronic back pain found to have severe, B/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 Pt is now s/p L4-5 laminectomy with fusion (4/12/23).    PLAN:  Neuro:  - Neuro/vital checks q4hr  - Pain management with Tylenol 1g, Oxy 5mg prin, gabapentin 400 tid, robaxin 500q8  - HMV drain (deep)- monitor output  - post op xrays completed  - LSO brace when OOB    Cardio:  - SBP goal   - Continue home Metoprolol and Amlodipine     Pulm:  - Incentive spirometry   - NC PRN  - Albuterol PRN    GI:  - Consistent carb diet   - Bowel regimen  - last BM 4/14    Endo:  - ISS  - F/U A1c    Renal:  - IVL  - voiding  - Electrolytes prn    Heme:  - SCDs for DVT prophylaxis, SQL     ID  - Post-op Ancef     Discussed with Dr. Penny

## 2023-04-16 NOTE — PROGRESS NOTE ADULT - SUBJECTIVE AND OBJECTIVE BOX
PROGRESS NOTE:   Authoted by Dr. Anish Morris MD, HERMILA  Available via [employer] Teams     Patient is a 57y old  Female who presents with a chief complaint of L4-5 posterior laminectomy and instrumented fusion (16 Apr 2023 02:01)    SUBJECTIVE / OVERNIGHT EVENTS:  No acute events overnight   No subjective complaints    MEDICATIONS  (STANDING):  acetaminophen     Tablet .. 1000 milliGRAM(s) Oral every 8 hours  amLODIPine   Tablet 5 milliGRAM(s) Oral daily  enoxaparin Injectable 40 milliGRAM(s) SubCutaneous every 24 hours  gabapentin 400 milliGRAM(s) Oral every 8 hours  glucagon  Injectable 1 milliGRAM(s) IntraMuscular Once  losartan 100 milliGRAM(s) Oral daily  methocarbamol 750 milliGRAM(s) Oral every 8 hours  metoprolol succinate  milliGRAM(s) Oral daily  polyethylene glycol 3350 17 Gram(s) Oral daily  senna 2 Tablet(s) Oral at bedtime    MEDICATIONS  (PRN):  albuterol    90 MICROgram(s) HFA Inhaler 2 Puff(s) Inhalation daily PRN Bronchospasm  oxyCODONE    IR 5 milliGRAM(s) Oral every 4 hours PRN Severe Pain (7 - 10)    OBJECTIVE:  Vital Signs Last 24 Hrs  T(C): 36.5 (16 Apr 2023 05:02), Max: 36.9 (15 Apr 2023 16:08)  T(F): 97.7 (16 Apr 2023 05:02), Max: 98.4 (15 Apr 2023 16:08)  HR: 74 (16 Apr 2023 05:02) (71 - 78)  BP: 106/77 (16 Apr 2023 05:02) (105/66 - 123/88)  RR: 15 (16 Apr 2023 05:02) (15 - 17)  SpO2: 96% (16 Apr 2023 05:02) (96% - 97%)    Parameters below as of 16 Apr 2023 05:02  Patient On (Oxygen Delivery Method): room air      I&O's Summary    15 Apr 2023 07:01  -  16 Apr 2023 07:00  --------------------------------------------------------  IN: 720 mL / OUT: 1115 mL / NET: -395 mL    CONSTITUTIONAL: NAD  HEAD:  Atraumatic, Normocephalic  EYES: EOMI, conjunctiva and sclera clear  ENMT: No tonsillar erythema, exudates, or enlargement; Moist mucous membranes  NECK: Supple, No JVD  NERVOUS SYSTEM: AOX3, motor and sensation grossly intact in b/l UE and b/l LE  PSYCHIATRIC: Appropriate affect and mood  CHEST/LUNG: Clear to auscultation bilaterally; No rales, rhonchi, wheezing, or rubs  HEART: Regular rate and rhythm; No murmurs, rubs, or gallops. No LE edema  ABDOMEN: Soft, Nontender, Nondistended; Bowel sounds present  EXTREMITIES: WWP, back- dressing c/d/i     LABS:                        10.4   5.67  )-----------( 314      ( 16 Apr 2023 05:30 )             32.3     04-16    136  |  100  |  9   ----------------------------<  129<H>  3.9   |  26  |  0.60    Ca    9.0      16 Apr 2023 05:30  Phos  3.7     04-16  Mg     1.9     04-16    POCT Blood Glucose.: 136 mg/dL (15 Apr 2023 18:22)

## 2023-04-16 NOTE — CHART NOTE - NSCHARTNOTEFT_GEN_A_CORE
CTH today with decrease in R subdural hygroma. Amantadine increased to 200mg BID. Pain controlled. Pending AR. Neuro exam stable.

## 2023-04-17 LAB
ANION GAP SERPL CALC-SCNC: 8 MMOL/L — SIGNIFICANT CHANGE UP (ref 5–17)
BUN SERPL-MCNC: 12 MG/DL — SIGNIFICANT CHANGE UP (ref 7–23)
CALCIUM SERPL-MCNC: 9.4 MG/DL — SIGNIFICANT CHANGE UP (ref 8.4–10.5)
CHLORIDE SERPL-SCNC: 98 MMOL/L — SIGNIFICANT CHANGE UP (ref 96–108)
CO2 SERPL-SCNC: 27 MMOL/L — SIGNIFICANT CHANGE UP (ref 22–31)
CREAT SERPL-MCNC: 0.64 MG/DL — SIGNIFICANT CHANGE UP (ref 0.5–1.3)
EGFR: 104 ML/MIN/1.73M2 — SIGNIFICANT CHANGE UP
GLUCOSE SERPL-MCNC: 130 MG/DL — HIGH (ref 70–99)
HCT VFR BLD CALC: 35.5 % — SIGNIFICANT CHANGE UP (ref 34.5–45)
HGB BLD-MCNC: 11.4 G/DL — LOW (ref 11.5–15.5)
MAGNESIUM SERPL-MCNC: 1.8 MG/DL — SIGNIFICANT CHANGE UP (ref 1.6–2.6)
MCHC RBC-ENTMCNC: 26 PG — LOW (ref 27–34)
MCHC RBC-ENTMCNC: 32.1 GM/DL — SIGNIFICANT CHANGE UP (ref 32–36)
MCV RBC AUTO: 81.1 FL — SIGNIFICANT CHANGE UP (ref 80–100)
NRBC # BLD: 0 /100 WBCS — SIGNIFICANT CHANGE UP (ref 0–0)
PHOSPHATE SERPL-MCNC: 4 MG/DL — SIGNIFICANT CHANGE UP (ref 2.5–4.5)
PLATELET # BLD AUTO: 362 K/UL — SIGNIFICANT CHANGE UP (ref 150–400)
POTASSIUM SERPL-MCNC: 4.2 MMOL/L — SIGNIFICANT CHANGE UP (ref 3.5–5.3)
POTASSIUM SERPL-SCNC: 4.2 MMOL/L — SIGNIFICANT CHANGE UP (ref 3.5–5.3)
RBC # BLD: 4.38 M/UL — SIGNIFICANT CHANGE UP (ref 3.8–5.2)
RBC # FLD: 15.5 % — HIGH (ref 10.3–14.5)
SARS-COV-2 RNA SPEC QL NAA+PROBE: NEGATIVE — SIGNIFICANT CHANGE UP
SODIUM SERPL-SCNC: 133 MMOL/L — LOW (ref 135–145)
WBC # BLD: 6.92 K/UL — SIGNIFICANT CHANGE UP (ref 3.8–10.5)
WBC # FLD AUTO: 6.92 K/UL — SIGNIFICANT CHANGE UP (ref 3.8–10.5)

## 2023-04-17 PROCEDURE — 99232 SBSQ HOSP IP/OBS MODERATE 35: CPT

## 2023-04-17 RX ORDER — MAGNESIUM SULFATE 500 MG/ML
2 VIAL (ML) INJECTION ONCE
Refills: 0 | Status: COMPLETED | OUTPATIENT
Start: 2023-04-17 | End: 2023-04-17

## 2023-04-17 RX ADMIN — OXYCODONE HYDROCHLORIDE 5 MILLIGRAM(S): 5 TABLET ORAL at 00:35

## 2023-04-17 RX ADMIN — Medication 25 GRAM(S): at 07:10

## 2023-04-17 RX ADMIN — OXYCODONE HYDROCHLORIDE 5 MILLIGRAM(S): 5 TABLET ORAL at 19:34

## 2023-04-17 RX ADMIN — METHOCARBAMOL 750 MILLIGRAM(S): 500 TABLET, FILM COATED ORAL at 05:35

## 2023-04-17 RX ADMIN — METHOCARBAMOL 750 MILLIGRAM(S): 500 TABLET, FILM COATED ORAL at 21:14

## 2023-04-17 RX ADMIN — Medication 1000 MILLIGRAM(S): at 06:35

## 2023-04-17 RX ADMIN — GABAPENTIN 400 MILLIGRAM(S): 400 CAPSULE ORAL at 21:14

## 2023-04-17 RX ADMIN — Medication 1000 MILLIGRAM(S): at 22:14

## 2023-04-17 RX ADMIN — Medication 1000 MILLIGRAM(S): at 05:35

## 2023-04-17 RX ADMIN — Medication 1000 MILLIGRAM(S): at 21:14

## 2023-04-17 RX ADMIN — OXYCODONE HYDROCHLORIDE 5 MILLIGRAM(S): 5 TABLET ORAL at 01:35

## 2023-04-17 RX ADMIN — OXYCODONE HYDROCHLORIDE 5 MILLIGRAM(S): 5 TABLET ORAL at 18:34

## 2023-04-17 RX ADMIN — OXYCODONE HYDROCHLORIDE 5 MILLIGRAM(S): 5 TABLET ORAL at 10:57

## 2023-04-17 RX ADMIN — SENNA PLUS 2 TABLET(S): 8.6 TABLET ORAL at 21:14

## 2023-04-17 RX ADMIN — GABAPENTIN 400 MILLIGRAM(S): 400 CAPSULE ORAL at 05:36

## 2023-04-17 RX ADMIN — GABAPENTIN 400 MILLIGRAM(S): 400 CAPSULE ORAL at 13:16

## 2023-04-17 RX ADMIN — Medication 1000 MILLIGRAM(S): at 13:15

## 2023-04-17 RX ADMIN — ENOXAPARIN SODIUM 40 MILLIGRAM(S): 100 INJECTION SUBCUTANEOUS at 21:13

## 2023-04-17 RX ADMIN — OXYCODONE HYDROCHLORIDE 5 MILLIGRAM(S): 5 TABLET ORAL at 09:57

## 2023-04-17 RX ADMIN — METHOCARBAMOL 750 MILLIGRAM(S): 500 TABLET, FILM COATED ORAL at 13:16

## 2023-04-17 RX ADMIN — POLYETHYLENE GLYCOL 3350 17 GRAM(S): 17 POWDER, FOR SOLUTION ORAL at 13:16

## 2023-04-17 NOTE — PROCEDURE NOTE - ADDITIONAL PROCEDURE DETAILS
Pt was informed on steps of procedure, location/patient name/ confirmed. Surgical incision site dressing was removed without difficulty, drain insertion site was visualized. Drain was taken off suction and insertion site and surrounding area was cleaned with chlorhexidine. HMV drain suture was visualized and removed without difficulty. HMV drain was removed without resistance, and pressure was held to insertion site with gauze. Three steri strips were placed with tension over the drain insertion site. Pt tolerated procedure well.

## 2023-04-17 NOTE — PROGRESS NOTE ADULT - PROBLEM SELECTOR PLAN 1
s/p OR with lami and fusion  Pain control  PT/OT  Bowel Regimen   LSO Brace for all activity
s/p OR with lami and fusion  Pain control  PT/OT  Bowel Regimen   LSO Brace for all activity  pending acute rehab
s/p OR with lami and fusion  Pain control  PT/OT  Bowel Regimen   LSO Brace for all activity  pending acute rehab
s/p OR with lami and fusion  Pain control  PT/OT  Bowel Regimen   LSO Brace for all activity
s/p OR with lami and fusion  Pain control  PT/OT  Bowel Regimen   LSO Brace for all activity  pending acute rehab

## 2023-04-17 NOTE — PROGRESS NOTE ADULT - SUBJECTIVE AND OBJECTIVE BOX
Patient is a 57y old  Female who presents with a chief complaint of L4-5 posterior laminectomy and instrumented fusion (18 Apr 2023 01:04)    INTERVAL EVENTS:    SUBJECTIVE:  Patient was seen and examined at bedside.    Review of systems: No CP, dyspnea, nausea or vomiting, dysuria, LE edema.     Diet, Regular (04-18-23 @ 06:59) [Active]      MEDICATIONS:  MEDICATIONS  (STANDING):  acetaminophen     Tablet .. 1000 milliGRAM(s) Oral every 8 hours  amLODIPine   Tablet 5 milliGRAM(s) Oral daily  enoxaparin Injectable 40 milliGRAM(s) SubCutaneous every 24 hours  gabapentin 400 milliGRAM(s) Oral every 8 hours  losartan 100 milliGRAM(s) Oral daily  methocarbamol 750 milliGRAM(s) Oral every 8 hours  metoprolol succinate  milliGRAM(s) Oral daily  polyethylene glycol 3350 17 Gram(s) Oral daily  senna 2 Tablet(s) Oral at bedtime    MEDICATIONS  (PRN):  albuterol    90 MICROgram(s) HFA Inhaler 2 Puff(s) Inhalation daily PRN Bronchospasm  oxyCODONE    IR 5 milliGRAM(s) Oral every 4 hours PRN Severe Pain (7 - 10)      Allergies    Compazine (Other)  penicillin (Other)    Intolerances        OBJECTIVE:  Vital Signs Last 24 Hrs  T(C): 36.8 (18 Apr 2023 05:20), Max: 37.1 (17 Apr 2023 18:10)  T(F): 98.2 (18 Apr 2023 05:20), Max: 98.7 (17 Apr 2023 18:10)  HR: 92 (18 Apr 2023 05:20) (79 - 102)  BP: 114/81 (18 Apr 2023 05:20) (102/76 - 129/88)  BP(mean): --  RR: 18 (18 Apr 2023 05:20) (18 - 18)  SpO2: 98% (18 Apr 2023 05:20) (95% - 99%)    Parameters below as of 18 Apr 2023 05:20  Patient On (Oxygen Delivery Method): room air      I&O's Summary    17 Apr 2023 07:01  -  18 Apr 2023 07:00  --------------------------------------------------------  IN: 420 mL / OUT: 660 mL / NET: -240 mL      PHYSICAL EXAM:  Gen: Reclining in bed at time of exam, appears stated age  HEENT: NCAT, MMM, clear OP  Neck: supple, trachea at midline  CV: RRR, +S1/S2  Pulm: adequate respiratory effort, no increase in work of breathing  Abd: soft, ND  Skin: warm and dry,   Ext: no LE edema ; Drain in place ;   Neuro: AOx3, speaking in full sentences  Psych: affect and behavior appropriate, pleasant at time of interview    LABS:                        11.4   6.92  )-----------( 362      ( 17 Apr 2023 06:18 )             35.5     04-17    133<L>  |  98  |  12  ----------------------------<  130<H>  4.2   |  27  |  0.64    Ca    9.4      17 Apr 2023 06:18  Phos  4.0     04-17  Mg     1.8     04-17          CAPILLARY BLOOD GLUCOSE            MICRODATA:      RADIOLOGY/OTHER STUDIES:

## 2023-04-17 NOTE — PROGRESS NOTE ADULT - PROBLEM SELECTOR PLAN 3
amlodipine 5  Toprol   losartan 100

## 2023-04-17 NOTE — PROGRESS NOTE ADULT - ASSESSMENT
57F w/ PMHx of DM and chronic back pain found to have severe, B/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 Pt is now s/p L4-5 laminectomy with fusion (4/12/23).

## 2023-04-17 NOTE — PROGRESS NOTE ADULT - SUBJECTIVE AND OBJECTIVE BOX
SUBJECTIVE: Patient complains of some anterior thigh pain/tightness, hot pack and pain meds with relief. Denies weakness, numbness, tingling, nausea, vomiting, chest pain, palpitations, shortness of breath, vision changes.     HOSPITAL COURSE:  4/12: POD 0 L4-5 laminectomy and fusion. Pt slower to wake up from anesthesia and complaining of 10/10 back pain. Pt received Dilaudid 0.5 for pain.   4/13: POD1 L4-5 lami/fusion. Pain better controlled overnight. Neuro exam stable. LSO delivered, marilee d'marlon today, passed TOV. SQL tonight. lyrica changed to gabapentin 300 tid  4/14: POD2 L4-5 lami/fusion. BREONNA overnight. Neuro exam stable. gabapentin increased to 400 tid. post op xrays completed   4/15: POD 3 L4-5 lami/fusion   4/16: POD 4 L4-5 lami/fusion   4/17: POD 5 L4-5 lami/fusion. Pending AR.     Vital Signs Last 24 Hrs  T(C): 36.9 (16 Apr 2023 20:32), Max: 36.9 (16 Apr 2023 20:32)  T(F): 98.5 (16 Apr 2023 20:32), Max: 98.5 (16 Apr 2023 20:32)  HR: 86 (16 Apr 2023 20:32) (74 - 86)  BP: 112/75 (16 Apr 2023 20:32) (100/67 - 112/75)  BP(mean): --  RR: 16 (16 Apr 2023 20:32) (15 - 17)  SpO2: 97% (16 Apr 2023 20:32) (94% - 97%)    Parameters below as of 16 Apr 2023 20:32  Patient On (Oxygen Delivery Method): room air    I&O's Summary    15 Apr 2023 07:01  -  16 Apr 2023 07:00  --------------------------------------------------------  IN: 720 mL / OUT: 1115 mL / NET: -395 mL    16 Apr 2023 07:01  -  17 Apr 2023 01:04  --------------------------------------------------------  IN: 240 mL / OUT: 30 mL / NET: 210 mL    PHYSICAL EXAM:  General: Pt is laying in hospital bed,NAD  Cardiovascular: RRR, normal S1 and S2   Respiratory: non-labored breathing, symmetric chest rise, CTA b/l  GI: abd soft, NTND   Neuro: A&O x 3, no aphasia, speech clear and fluent  CNII-XII: EOM intact, PERRL, face symmetric  Motor: UE 5/5 throughout B/L, B/L 4/5 HF (limited 2/2 pain,) otherwise 4+/5 throughout   Wounds: lumbar incision site with gauze dressing in place C/D/I  Hemovac to full suction.    LABS:                        10.4   5.67  )-----------( 314      ( 16 Apr 2023 05:30 )             32.3     04-16    136  |  100  |  9   ----------------------------<  129<H>  3.9   |  26  |  0.60    Ca    9.0      16 Apr 2023 05:30  Phos  3.7     04-16  Mg     1.9     04-16              CAPILLARY BLOOD GLUCOSE          Drug Levels: [] N/A    CSF Analysis: [] N/A      Allergies    Compazine (Other)  penicillin (Other)    Intolerances      MEDICATIONS:  Antibiotics:    Neuro:  acetaminophen     Tablet .. 1000 milliGRAM(s) Oral every 8 hours  gabapentin 400 milliGRAM(s) Oral every 8 hours  methocarbamol 750 milliGRAM(s) Oral every 8 hours  oxyCODONE    IR 5 milliGRAM(s) Oral every 4 hours PRN    Anticoagulation:  enoxaparin Injectable 40 milliGRAM(s) SubCutaneous every 24 hours    OTHER:  albuterol    90 MICROgram(s) HFA Inhaler 2 Puff(s) Inhalation daily PRN  amLODIPine   Tablet 5 milliGRAM(s) Oral daily  losartan 100 milliGRAM(s) Oral daily  metoprolol succinate  milliGRAM(s) Oral daily  polyethylene glycol 3350 17 Gram(s) Oral daily  senna 2 Tablet(s) Oral at bedtime    IVF:    CULTURES:    RADIOLOGY & ADDITIONAL TESTS:      ASSESSMENT:  Pt is a 57F w/ PMHx of DM and chronic back pain found to have severe, B/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 Pt is now s/p L4-5 laminectomy with fusion (4/12/23).    PLAN:  Neuro:  - Neuro/vital checks q4hr  - Pain management with Tylenol 1g, Oxy 5mg prin, gabapentin 400 tid, robaxin 500q8  - HMV drain (deep)- monitor output  - post op xrays completed  - LSO brace when OOB    Cardio:  - SBP goal   - Continue home Metoprolol and Amlodipine     Pulm:  - Incentive spirometry   - NC PRN  - Albuterol PRN    GI:  - Consistent carb diet   - Bowel regimen  - last BM 4/14    Endo:  - ISS  - F/U A1c    Renal:  - IVL  - voiding  - Electrolytes prn    Heme:  - SCDs for DVT prophylaxis, SQL     ID  - Post-op Ancef     DISPO  - AR     Discussed with Dr. Penny

## 2023-04-18 ENCOUNTER — TRANSCRIPTION ENCOUNTER (OUTPATIENT)
Age: 58
End: 2023-04-18

## 2023-04-18 VITALS — HEART RATE: 88 BPM | SYSTOLIC BLOOD PRESSURE: 101 MMHG | DIASTOLIC BLOOD PRESSURE: 66 MMHG

## 2023-04-18 LAB
ANION GAP SERPL CALC-SCNC: 10 MMOL/L — SIGNIFICANT CHANGE UP (ref 5–17)
BUN SERPL-MCNC: 10 MG/DL — SIGNIFICANT CHANGE UP (ref 7–23)
CALCIUM SERPL-MCNC: 9.1 MG/DL — SIGNIFICANT CHANGE UP (ref 8.4–10.5)
CHLORIDE SERPL-SCNC: 100 MMOL/L — SIGNIFICANT CHANGE UP (ref 96–108)
CO2 SERPL-SCNC: 24 MMOL/L — SIGNIFICANT CHANGE UP (ref 22–31)
CREAT SERPL-MCNC: 0.56 MG/DL — SIGNIFICANT CHANGE UP (ref 0.5–1.3)
EGFR: 108 ML/MIN/1.73M2 — SIGNIFICANT CHANGE UP
GLUCOSE SERPL-MCNC: 117 MG/DL — HIGH (ref 70–99)
HCT VFR BLD CALC: 32.5 % — LOW (ref 34.5–45)
HGB BLD-MCNC: 10.5 G/DL — LOW (ref 11.5–15.5)
MAGNESIUM SERPL-MCNC: 2 MG/DL — SIGNIFICANT CHANGE UP (ref 1.6–2.6)
MCHC RBC-ENTMCNC: 26.1 PG — LOW (ref 27–34)
MCHC RBC-ENTMCNC: 32.3 GM/DL — SIGNIFICANT CHANGE UP (ref 32–36)
MCV RBC AUTO: 80.8 FL — SIGNIFICANT CHANGE UP (ref 80–100)
NRBC # BLD: 0 /100 WBCS — SIGNIFICANT CHANGE UP (ref 0–0)
PHOSPHATE SERPL-MCNC: 3.8 MG/DL — SIGNIFICANT CHANGE UP (ref 2.5–4.5)
PLATELET # BLD AUTO: 327 K/UL — SIGNIFICANT CHANGE UP (ref 150–400)
POTASSIUM SERPL-MCNC: 4.3 MMOL/L — SIGNIFICANT CHANGE UP (ref 3.5–5.3)
POTASSIUM SERPL-SCNC: 4.3 MMOL/L — SIGNIFICANT CHANGE UP (ref 3.5–5.3)
RBC # BLD: 4.02 M/UL — SIGNIFICANT CHANGE UP (ref 3.8–5.2)
RBC # FLD: 15.5 % — HIGH (ref 10.3–14.5)
SODIUM SERPL-SCNC: 134 MMOL/L — LOW (ref 135–145)
WBC # BLD: 4.96 K/UL — SIGNIFICANT CHANGE UP (ref 3.8–10.5)
WBC # FLD AUTO: 4.96 K/UL — SIGNIFICANT CHANGE UP (ref 3.8–10.5)

## 2023-04-18 PROCEDURE — 97165 OT EVAL LOW COMPLEX 30 MIN: CPT

## 2023-04-18 PROCEDURE — C1889: CPT

## 2023-04-18 PROCEDURE — 86900 BLOOD TYPING SEROLOGIC ABO: CPT

## 2023-04-18 PROCEDURE — 83735 ASSAY OF MAGNESIUM: CPT

## 2023-04-18 PROCEDURE — 97162 PT EVAL MOD COMPLEX 30 MIN: CPT

## 2023-04-18 PROCEDURE — 83036 HEMOGLOBIN GLYCOSYLATED A1C: CPT

## 2023-04-18 PROCEDURE — 87635 SARS-COV-2 COVID-19 AMP PRB: CPT

## 2023-04-18 PROCEDURE — 84100 ASSAY OF PHOSPHORUS: CPT

## 2023-04-18 PROCEDURE — 76000 FLUOROSCOPY <1 HR PHYS/QHP: CPT

## 2023-04-18 PROCEDURE — 85027 COMPLETE CBC AUTOMATED: CPT

## 2023-04-18 PROCEDURE — 80048 BASIC METABOLIC PNL TOTAL CA: CPT

## 2023-04-18 PROCEDURE — 36415 COLL VENOUS BLD VENIPUNCTURE: CPT

## 2023-04-18 PROCEDURE — 86850 RBC ANTIBODY SCREEN: CPT

## 2023-04-18 PROCEDURE — 97116 GAIT TRAINING THERAPY: CPT

## 2023-04-18 PROCEDURE — 82962 GLUCOSE BLOOD TEST: CPT

## 2023-04-18 PROCEDURE — 86901 BLOOD TYPING SEROLOGIC RH(D): CPT

## 2023-04-18 PROCEDURE — 97535 SELF CARE MNGMENT TRAINING: CPT

## 2023-04-18 PROCEDURE — 72100 X-RAY EXAM L-S SPINE 2/3 VWS: CPT

## 2023-04-18 PROCEDURE — 97110 THERAPEUTIC EXERCISES: CPT

## 2023-04-18 PROCEDURE — 85025 COMPLETE CBC W/AUTO DIFF WBC: CPT

## 2023-04-18 PROCEDURE — C1713: CPT

## 2023-04-18 RX ORDER — POLYETHYLENE GLYCOL 3350 17 G/17G
17 POWDER, FOR SOLUTION ORAL
Qty: 0 | Refills: 0 | DISCHARGE
Start: 2023-04-18

## 2023-04-18 RX ORDER — ACETAMINOPHEN 500 MG
2 TABLET ORAL
Qty: 0 | Refills: 0 | DISCHARGE
Start: 2023-04-18

## 2023-04-18 RX ORDER — MAGNESIUM SULFATE 500 MG/ML
2 VIAL (ML) INJECTION ONCE
Refills: 0 | Status: COMPLETED | OUTPATIENT
Start: 2023-04-18 | End: 2023-04-18

## 2023-04-18 RX ORDER — ENOXAPARIN SODIUM 100 MG/ML
40 INJECTION SUBCUTANEOUS
Qty: 0 | Refills: 0 | DISCHARGE
Start: 2023-04-18

## 2023-04-18 RX ORDER — MAGNESIUM SULFATE 500 MG/ML
1 VIAL (ML) INJECTION ONCE
Refills: 0 | Status: DISCONTINUED | OUTPATIENT
Start: 2023-04-18 | End: 2023-04-18

## 2023-04-18 RX ORDER — OXYCODONE HYDROCHLORIDE 5 MG/1
1 TABLET ORAL
Qty: 0 | Refills: 0 | DISCHARGE
Start: 2023-04-18

## 2023-04-18 RX ORDER — GABAPENTIN 400 MG/1
1 CAPSULE ORAL
Qty: 0 | Refills: 0 | DISCHARGE
Start: 2023-04-18

## 2023-04-18 RX ORDER — SENNA PLUS 8.6 MG/1
2 TABLET ORAL
Qty: 0 | Refills: 0 | DISCHARGE
Start: 2023-04-18

## 2023-04-18 RX ORDER — METHOCARBAMOL 500 MG/1
1 TABLET, FILM COATED ORAL
Qty: 0 | Refills: 0 | DISCHARGE
Start: 2023-04-18

## 2023-04-18 RX ORDER — SODIUM CHLORIDE 9 MG/ML
500 INJECTION INTRAMUSCULAR; INTRAVENOUS; SUBCUTANEOUS ONCE
Refills: 0 | Status: COMPLETED | OUTPATIENT
Start: 2023-04-18 | End: 2023-04-18

## 2023-04-18 RX ADMIN — SODIUM CHLORIDE 1000 MILLILITER(S): 9 INJECTION INTRAMUSCULAR; INTRAVENOUS; SUBCUTANEOUS at 13:20

## 2023-04-18 RX ADMIN — Medication 1000 MILLIGRAM(S): at 06:15

## 2023-04-18 RX ADMIN — METHOCARBAMOL 750 MILLIGRAM(S): 500 TABLET, FILM COATED ORAL at 13:23

## 2023-04-18 RX ADMIN — GABAPENTIN 400 MILLIGRAM(S): 400 CAPSULE ORAL at 13:23

## 2023-04-18 RX ADMIN — Medication 1000 MILLIGRAM(S): at 13:23

## 2023-04-18 RX ADMIN — Medication 25 GRAM(S): at 07:50

## 2023-04-18 NOTE — PROGRESS NOTE ADULT - REASON FOR ADMISSION
L4-5 posterior laminectomy and instrumented fusion

## 2023-04-18 NOTE — DISCHARGE NOTE NURSING/CASE MANAGEMENT/SOCIAL WORK - NSDCFUADDAPPT_GEN_ALL_CORE_FT
Please follow up with Dr. Penny, call the office to make/confirm appointment at [phone]    Please follow up with your primary care doctor

## 2023-04-18 NOTE — PROGRESS NOTE ADULT - SUBJECTIVE AND OBJECTIVE BOX
SUBJECTIVE: Patient states she is feeling ready to go to rehab. Discussed discharge today to [facility]. Denies weakness, numbness, tingling, nausea, vomiting, chest pain, palpitations, shortness of breath, vision changes.     HOSPITAL COURSE:  4/12: POD 0 L4-5 laminectomy and fusion. Pt slower to wake up from anesthesia and complaining of 10/10 back pain. Pt received Dilaudid 0.5 for pain.   4/13: POD1 L4-5 lami/fusion. Pain better controlled overnight. Neuro exam stable. LSO delivered, marilee mcallister today, passed TOV. SQL tonight. lyrica changed to gabapentin 300 tid  4/14: POD2 L4-5 lami/fusion. BREONNA overnight. Neuro exam stable. gabapentin increased to 400 tid. post op xrays completed   4/15: POD 3 L4-5 lami/fusion   4/16: POD 4 L4-5 lami/fusion   4/17: POD 5 L4-5 lami/fusion. Pending AR. HMV drain removed.   4/18: POD 6 L4-5 lami/fusion. Accepted to [facility], pending dispo.     Vital Signs Last 24 Hrs  T(C): 36.9 (17 Apr 2023 20:40), Max: 37.1 (17 Apr 2023 18:10)  T(F): 98.4 (17 Apr 2023 20:40), Max: 98.7 (17 Apr 2023 18:10)  HR: 85 (17 Apr 2023 20:40) (79 - 102)  BP: 110/68 (17 Apr 2023 20:40) (99/68 - 129/88)  BP(mean): --  RR: 18 (17 Apr 2023 20:40) (15 - 18)  SpO2: 95% (17 Apr 2023 20:40) (95% - 99%)    Parameters below as of 17 Apr 2023 20:40  Patient On (Oxygen Delivery Method): room air    I&O's Summary    16 Apr 2023 07:01  -  17 Apr 2023 07:00  --------------------------------------------------------  IN: 480 mL / OUT: 35 mL / NET: 445 mL    17 Apr 2023 07:01  -  18 Apr 2023 01:04  --------------------------------------------------------  IN: 420 mL / OUT: 660 mL / NET: -240 mL    PHYSICAL EXAM:  General: Pt is laying in hospital bed, NAD  Cardiovascular: RRR, normal S1 and S2   Respiratory: non-labored breathing, symmetric chest rise, CTA b/l  GI: abd soft, NTND   Neuro: A&O x 3, no aphasia, speech clear and fluent  CNII-XII: EOM intact, PERRL, face symmetric  Motor: [place] 5/5 throughout UE/LE   Wounds: lumbar incision site c/d/i, steristrips over HMV removal site     LABS:                        11.4   6.92  )-----------( 362      ( 17 Apr 2023 06:18 )             35.5     04-17    133<L>  |  98  |  12  ----------------------------<  130<H>  4.2   |  27  |  0.64    Ca    9.4      17 Apr 2023 06:18  Phos  4.0     04-17  Mg     1.8     04-17    CAPILLARY BLOOD GLUCOSE    Drug Levels: [] N/A    CSF Analysis: [] N/A    Allergies    Compazine (Other)  penicillin (Other)    Intolerances    MEDICATIONS:  Antibiotics:    Neuro:  acetaminophen     Tablet .. 1000 milliGRAM(s) Oral every 8 hours  gabapentin 400 milliGRAM(s) Oral every 8 hours  methocarbamol 750 milliGRAM(s) Oral every 8 hours  oxyCODONE    IR 5 milliGRAM(s) Oral every 4 hours PRN    Anticoagulation:  enoxaparin Injectable 40 milliGRAM(s) SubCutaneous every 24 hours    OTHER:  albuterol    90 MICROgram(s) HFA Inhaler 2 Puff(s) Inhalation daily PRN  amLODIPine   Tablet 5 milliGRAM(s) Oral daily  losartan 100 milliGRAM(s) Oral daily  metoprolol succinate  milliGRAM(s) Oral daily  polyethylene glycol 3350 17 Gram(s) Oral daily  senna 2 Tablet(s) Oral at bedtime    IVF:    CULTURES:    RADIOLOGY & ADDITIONAL TESTS:      ASSESSMENT:  Pt is a 57F w/ PMHx of DM and chronic back pain found to have severe, B/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 Pt is now s/p L4-5 laminectomy with fusion (4/12/23).    PLAN:  Neuro:  - Neuro/vital checks q4hr  - Pain management with Tylenol 1g, Oxy 5mg prin, gabapentin 400 tid, robaxin 500q8  - post op xrays completed  - LSO brace when OOB    Cardio:  - SBP goal   - Continue home Metoprolol and Amlodipine     Pulm:  - Incentive spirometry   - NC PRN  - Albuterol PRN    GI:  - Consistent carb diet   - Bowel regimen  - last BM 4/14    Endo:  - No issues     Renal:  - IVL  - voiding  - Electrolytes prn    Heme:  - SCDs for DVT prophylaxis, SQL     ID  - Post-op Ancef     DISPO  - AR     Discussed with Dr. Penny

## 2023-04-18 NOTE — PROGRESS NOTE ADULT - PROVIDER SPECIALTY LIST ADULT
Hospitalist
Neurosurgery
Hospitalist
Neurosurgery
Hospitalist

## 2023-04-18 NOTE — DISCHARGE NOTE NURSING/CASE MANAGEMENT/SOCIAL WORK - NSDCPEFALRISK_GEN_ALL_CORE
For information on Fall & Injury Prevention, visit: [email or web address] OR  [email or web address] OR  https://www.cdc.gov/steadi/patient.html

## 2023-04-18 NOTE — CHART NOTE - NSCHARTNOTESELECT_GEN_ALL_CORE
4-17/Event Note
Acute Rehab/Event Note
Transportation/Event Note
Event Note
Progress Note/Event Note
Progress Note/Event Note

## 2023-04-18 NOTE — DISCHARGE NOTE NURSING/CASE MANAGEMENT/SOCIAL WORK - PATIENT PORTAL LINK FT
You can access the FollowMyHealth Patient Portal offered by [facility] by registering at the following website: [email or web address]. By joining [facility]’s FollowMyHealth portal, you will also be able to view your health information using other applications (apps) compatible with our system.

## 2023-04-18 NOTE — CHART NOTE - NSCHARTNOTEFT_GEN_A_CORE
Patient unable to sit for extended periods of time, would benefit from stretcher for transportation to rehab.

## 2023-04-19 PROBLEM — Z87.898 PERSONAL HISTORY OF OTHER SPECIFIED CONDITIONS: Chronic | Status: ACTIVE | Noted: 2023-04-12

## 2023-04-21 DIAGNOSIS — E83.51 HYPOCALCEMIA: ICD-10-CM

## 2023-04-21 DIAGNOSIS — D62 ACUTE POSTHEMORRHAGIC ANEMIA: ICD-10-CM

## 2023-04-21 DIAGNOSIS — E11.9 TYPE 2 DIABETES MELLITUS WITHOUT COMPLICATIONS: ICD-10-CM

## 2023-04-21 DIAGNOSIS — M54.16 RADICULOPATHY, LUMBAR REGION: ICD-10-CM

## 2023-04-21 DIAGNOSIS — Z98.1 ARTHRODESIS STATUS: ICD-10-CM

## 2023-04-21 DIAGNOSIS — M48.07 SPINAL STENOSIS, LUMBOSACRAL REGION: ICD-10-CM

## 2023-04-21 DIAGNOSIS — E83.42 HYPOMAGNESEMIA: ICD-10-CM

## 2023-04-21 DIAGNOSIS — M48.062 SPINAL STENOSIS, LUMBAR REGION WITH NEUROGENIC CLAUDICATION: ICD-10-CM

## 2023-04-21 DIAGNOSIS — M43.16 SPONDYLOLISTHESIS, LUMBAR REGION: ICD-10-CM

## 2023-04-21 DIAGNOSIS — I10 ESSENTIAL (PRIMARY) HYPERTENSION: ICD-10-CM

## 2023-04-21 DIAGNOSIS — Z20.822 CONTACT WITH AND (SUSPECTED) EXPOSURE TO COVID-19: ICD-10-CM

## 2023-04-21 DIAGNOSIS — Z88.8 ALLERGY STATUS TO OTHER DRUGS, MEDICAMENTS AND BIOLOGICAL SUBSTANCES: ICD-10-CM

## 2023-04-21 DIAGNOSIS — G89.29 OTHER CHRONIC PAIN: ICD-10-CM

## 2023-04-21 DIAGNOSIS — J45.909 UNSPECIFIED ASTHMA, UNCOMPLICATED: ICD-10-CM

## 2023-04-21 DIAGNOSIS — Z88.0 ALLERGY STATUS TO PENICILLIN: ICD-10-CM

## 2023-04-21 DIAGNOSIS — Z79.84 LONG TERM (CURRENT) USE OF ORAL HYPOGLYCEMIC DRUGS: ICD-10-CM

## 2023-05-01 ENCOUNTER — NON-APPOINTMENT (OUTPATIENT)
Age: 58
End: 2023-05-01

## 2023-05-01 PROBLEM — M54.16 RADICULOPATHY, LUMBAR REGION: Chronic | Status: ACTIVE | Noted: 2023-04-11

## 2023-05-01 PROBLEM — J45.909 UNSPECIFIED ASTHMA, UNCOMPLICATED: Chronic | Status: ACTIVE | Noted: 2023-04-11

## 2023-05-01 PROBLEM — J32.9 CHRONIC SINUSITIS, UNSPECIFIED: Chronic | Status: ACTIVE | Noted: 2023-04-11

## 2023-05-01 PROBLEM — E11.9 TYPE 2 DIABETES MELLITUS WITHOUT COMPLICATIONS: Chronic | Status: ACTIVE | Noted: 2023-04-11

## 2023-05-01 PROBLEM — I10 ESSENTIAL (PRIMARY) HYPERTENSION: Chronic | Status: ACTIVE | Noted: 2023-04-11

## 2023-05-02 ENCOUNTER — RESULT REVIEW (OUTPATIENT)
Age: 58
End: 2023-05-02

## 2023-05-02 ENCOUNTER — OUTPATIENT (OUTPATIENT)
Dept: OUTPATIENT SERVICES | Facility: HOSPITAL | Age: 58
LOS: 1 days | End: 2023-05-02
Payer: COMMERCIAL

## 2023-05-02 ENCOUNTER — APPOINTMENT (OUTPATIENT)
Dept: SPINE | Facility: CLINIC | Age: 58
End: 2023-05-02

## 2023-05-02 ENCOUNTER — APPOINTMENT (OUTPATIENT)
Dept: NEUROSURGERY | Facility: CLINIC | Age: 58
End: 2023-05-02
Payer: COMMERCIAL

## 2023-05-02 VITALS
RESPIRATION RATE: 18 BRPM | TEMPERATURE: 98 F | OXYGEN SATURATION: 97 % | BODY MASS INDEX: 29.44 KG/M2 | HEIGHT: 62 IN | HEART RATE: 74 BPM | WEIGHT: 160 LBS | DIASTOLIC BLOOD PRESSURE: 74 MMHG | SYSTOLIC BLOOD PRESSURE: 108 MMHG

## 2023-05-02 DIAGNOSIS — M54.9 DORSALGIA, UNSPECIFIED: ICD-10-CM

## 2023-05-02 DIAGNOSIS — K59.00 CONSTIPATION, UNSPECIFIED: ICD-10-CM

## 2023-05-02 DIAGNOSIS — Z98.890 OTHER SPECIFIED POSTPROCEDURAL STATES: Chronic | ICD-10-CM

## 2023-05-02 DIAGNOSIS — Z98.891 HISTORY OF UTERINE SCAR FROM PREVIOUS SURGERY: Chronic | ICD-10-CM

## 2023-05-02 PROCEDURE — 72110 X-RAY EXAM L-2 SPINE 4/>VWS: CPT

## 2023-05-02 PROCEDURE — 99024 POSTOP FOLLOW-UP VISIT: CPT

## 2023-05-02 PROCEDURE — 72110 X-RAY EXAM L-2 SPINE 4/>VWS: CPT | Mod: 26

## 2023-05-02 RX ORDER — CYCLOBENZAPRINE HYDROCHLORIDE 5 MG/1
5 TABLET, FILM COATED ORAL 3 TIMES DAILY
Qty: 90 | Refills: 0 | Status: ACTIVE | COMMUNITY
Start: 2023-05-02 | End: 1900-01-01

## 2023-05-02 RX ORDER — GABAPENTIN 600 MG/1
600 TABLET, COATED ORAL
Refills: 0 | Status: ACTIVE | COMMUNITY

## 2023-05-02 RX ORDER — LORATADINE 5 MG/5 ML
10 SOLUTION, ORAL ORAL DAILY
Qty: 30 | Refills: 2 | Status: ACTIVE | COMMUNITY
Start: 2023-05-02 | End: 1900-01-01

## 2023-05-02 RX ORDER — IBUPROFEN 600 MG/1
600 TABLET, FILM COATED ORAL 3 TIMES DAILY
Qty: 90 | Refills: 0 | Status: ACTIVE | COMMUNITY
Start: 2023-05-02 | End: 1900-01-01

## 2023-05-02 RX ORDER — POLYETHYLENE GLYCOL 3350 17 G/17G
17 POWDER, FOR SOLUTION ORAL DAILY
Qty: 14 | Refills: 0 | Status: ACTIVE | COMMUNITY
Start: 2023-05-02 | End: 1900-01-01

## 2023-05-09 NOTE — REVIEW OF SYSTEMS
[Difficulty Walking] : difficulty walking [Feeling Poorly] : not feeling poorly [Feeling Tired] : not feeling tired [Confused or Disoriented] : no confusion [Arm Weakness] : no arm weakness [Leg Weakness] : no leg weakness [Numbness] : no numbness [Tingling] : no tingling [Seizures] : no convulsions [Dizziness] : no dizziness [Muscle Weakness] : no muscle weakness

## 2023-05-09 NOTE — PHYSICAL EXAM
[General Appearance - In No Acute Distress] : in no acute distress [General Appearance - Alert] : alert [Healing Well] : healing well [No Drainage] : without drainage [Normal Skin] : normal [Oriented To Time, Place, And Person] : oriented to person, place, and time [Limited Balance] : the patient's balance was impaired [No CVA Tenderness] : no ~M costovertebral angle tenderness [Erythema] : not erythematous [Tender] : not tender [Warm] : not warm

## 2023-05-09 NOTE — ASSESSMENT
[FreeTextEntry1] : AP/lateral Xray lumbar spine from 5/2/23 reviewed by Dr. Penny with patient demonstrates stability s/p L4-5 fusion performed on 4/12/23\par \par Recommend MiraLAX for constipation, Claritin for allergies to avoid straining while sneezing and ibuprofen 600 mg TID for inflammation\par \par Perform CT lumbar spine in July 2023\par \par Return to the office in 1 month for check in (June 2023)\par \par Patient provided work note and short term disability \par \par Patient and patient's family verbalize agreement and understanding with plan of care.\par \par I, Dr. Penny, personally performed the evaluation and management (E/M) services for this established patient who presents today with (a) new problem(s)/exacerbation of (an) existing condition(s).  That E/M includes conducting the examination, assessing all new/exacerbated conditions, and establishing a new plan of care.  Today, my ACP, Abeba Menendez, was here to observe my evaluation and management services for this new problem/exacerbated condition to be followed going forward.\par

## 2023-05-09 NOTE — HISTORY OF PRESENT ILLNESS
[FreeTextEntry1] : severe L4-S1 b/l foraminal stenosis with L4-5 spondylolisthesis who returns today to review CT L-spine. [de-identified] : 57 year old woman with past medical history DM, HTN who presents today for neurosurgical evaluation of cervical and lumbar spine.\par \par Ms. Ramon states that she has suffered from chronic back pain for several years. She states her symptoms have progressed within the past year. She describes low back pain radiating to RLE with intermittent numbness/tingling. Pain/numbness/tingling involves the entire leg to the foot. She also reports RLE weakness. She has done physical therapy for several years which initially helped with symptoms but now is ineffective. She also reports several injections by pain management over the past year which have been ineffective. 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 She can only walk several city blocks before sitting down to rest due to pain.\par \par She also reports neck pain radiating to bilateral upper extremities. Reports bilateral hand and arm weakness.\par \par She brings MRI lumbar and MRI cervical spine from October 2021 for review.\par \par At present, she states her low back pain and leg pain is greatly affecting her quality of life. \par She states she has seen surgeons in the past for her back and has been recommended surgery but refused.\par \par 1/23/2023 VISIT\par She returns to the office today to review MRI done 12/12/22. Continues to report severe radicular pain radiating to RIGHT leg. She has completed physical therapy with no improvement in symptoms and has had 2 ESIs with no relief. \par \par TODAY 5/2/23:\par Patient presents for POA s/p L4-5 fusion performed on 4/12/23. She is now home following rehab which she was there for only one week. She continues to wear TSLO brace and is walking with a walker. She is currently taking gabapentin 600 mg tid

## 2023-06-01 PROBLEM — Z98.1 HISTORY OF LUMBAR FUSION: Status: ACTIVE | Noted: 2023-06-01

## 2023-06-01 PROBLEM — Z51.89 VISIT FOR WOUND CHECK: Status: ACTIVE | Noted: 2023-06-01

## 2023-06-02 ENCOUNTER — APPOINTMENT (OUTPATIENT)
Dept: NEUROSURGERY | Facility: CLINIC | Age: 58
End: 2023-06-02
Payer: COMMERCIAL

## 2023-06-02 VITALS
WEIGHT: 154 LBS | BODY MASS INDEX: 28.34 KG/M2 | HEART RATE: 80 BPM | SYSTOLIC BLOOD PRESSURE: 136 MMHG | DIASTOLIC BLOOD PRESSURE: 83 MMHG | RESPIRATION RATE: 17 BRPM | OXYGEN SATURATION: 97 % | HEIGHT: 62 IN

## 2023-06-02 DIAGNOSIS — Z51.89 ENCOUNTER FOR OTHER SPECIFIED AFTERCARE: ICD-10-CM

## 2023-06-02 DIAGNOSIS — Z98.1 ARTHRODESIS STATUS: ICD-10-CM

## 2023-06-02 PROCEDURE — 99024 POSTOP FOLLOW-UP VISIT: CPT

## 2023-06-02 RX ORDER — LIDOCAINE 5% 700 MG/1
5 PATCH TOPICAL
Qty: 20 | Refills: 0 | Status: ACTIVE | COMMUNITY
Start: 2023-06-02 | End: 1900-01-01

## 2023-06-07 NOTE — ASSESSMENT
[FreeTextEntry1] : Incision CDI. \par \par Recommending pt start physical therapy, flexeril/ lidocaine for pain. \par \par Will get Ct lumbar spine without contrast 1 month. \par RTC after imaging to review, will get Xray lumbar Ap/lat at follow- up. \par \par Patient verbalizes agreement and understanding with plan of care.\par \par I, Dr. Penny, personally performed the evaluation and management (E/M) services for this established patient who presents today with (a) new problem(s)/exacerbation of (an) existing condition(s).  That E/M includes conducting the examination, assessing all new/exacerbated conditions, and establishing a new plan of care.  Today, my ACP, Abeba Menendez, was here to observe my evaluation and management services for this new problem/exacerbated condition to be followed going forward.\par \par

## 2023-06-07 NOTE — HISTORY OF PRESENT ILLNESS
[de-identified] : 56 y/o female with PMHx HTN, DM, who presented with chronic back pain for several years. Described as low back pain radiating to RLE with numbness/tingling. Pain/numbness/tingling involves the entire leg to the foot. She also reports RLE weakness. She did physical therapy for several years which initially helped with symptoms but then was no longer effective. Also tried KEVAN x 2 without relief. MRI with severe L4-S1 b/I foraminal stenosis with L4-5 spondylolisthesis. 4/12/23 pt underwent L4-5 laminectomy and fusion. \par \par Hospital course uncomplicated and dc to [facility] 4/18/12. \par \par 5/2/23 post op: now home from rehab. Wears TLSO brace and walking with walker. Xray lumbar spine from 5/2/23 demonstrated stability s/p L4-5 fusion. \par Plan made for her to RTC 1 month for f/u. \par \par TODAY pt returns for follow- up. \par She endorses back pain has improved post op but continues to have pain in her legs that radiates posterolaterally down legs to feet with numbness/tinging, right worse than left that is the same as pre- op. \par Also notes right leg weakness that is not new. \par Pain worse with ambulating or prolonged periods of sitting. \par Continues to weat back brace for support.

## 2023-07-05 ENCOUNTER — APPOINTMENT (OUTPATIENT)
Dept: NEUROSURGERY | Facility: CLINIC | Age: 58
End: 2023-07-05
Payer: COMMERCIAL

## 2023-07-05 ENCOUNTER — OUTPATIENT (OUTPATIENT)
Dept: OUTPATIENT SERVICES | Facility: HOSPITAL | Age: 58
LOS: 1 days | End: 2023-07-05
Payer: COMMERCIAL

## 2023-07-05 ENCOUNTER — RESULT REVIEW (OUTPATIENT)
Age: 58
End: 2023-07-05

## 2023-07-05 VITALS
DIASTOLIC BLOOD PRESSURE: 83 MMHG | HEART RATE: 80 BPM | TEMPERATURE: 98.2 F | OXYGEN SATURATION: 98 % | SYSTOLIC BLOOD PRESSURE: 121 MMHG

## 2023-07-05 DIAGNOSIS — Z98.891 HISTORY OF UTERINE SCAR FROM PREVIOUS SURGERY: Chronic | ICD-10-CM

## 2023-07-05 DIAGNOSIS — Z98.890 OTHER SPECIFIED POSTPROCEDURAL STATES: Chronic | ICD-10-CM

## 2023-07-05 DIAGNOSIS — M54.16 RADICULOPATHY, LUMBAR REGION: ICD-10-CM

## 2023-07-05 PROCEDURE — 99024 POSTOP FOLLOW-UP VISIT: CPT

## 2023-07-05 PROCEDURE — 72100 X-RAY EXAM L-S SPINE 2/3 VWS: CPT

## 2023-07-05 PROCEDURE — 72100 X-RAY EXAM L-S SPINE 2/3 VWS: CPT | Mod: 26

## 2023-07-05 RX ORDER — CYCLOBENZAPRINE HYDROCHLORIDE 5 MG/1
5 TABLET, FILM COATED ORAL EVERY 8 HOURS
Qty: 42 | Refills: 0 | Status: ACTIVE | COMMUNITY
Start: 2023-06-02 | End: 1900-01-01

## 2023-07-09 NOTE — REASON FOR VISIT
[Follow-Up: _____] : a [unfilled] follow-up visit [FreeTextEntry1] : s/p L4-5 decompression with posterior fusion on 4/12/23 returns to follow up to review new XR L-spine\par XR reviewed, shows hardware in good position\par patient feeling much better, ambulating well, pain controlled

## 2023-07-09 NOTE — HISTORY OF PRESENT ILLNESS
[de-identified] : 56 y/o female with PMHx HTN, DM, who presented with chronic back pain for several years. Described as low back pain radiating to RLE with numbness/tingling. Pain/numbness/tingling involves the entire leg to the foot. She also reports RLE weakness. She did physical therapy for several years which initially helped with symptoms but then was no longer effective. Also tried KEVAN x 2 without relief. MRI with severe L4-S1 b/I foraminal stenosis with L4-5 spondylolisthesis. 4/12/23 pt underwent L4-5 laminectomy and fusion. \par \par Hospital course uncomplicated and dc to [facility] 4/18/12. \par \par 5/2/23 post op: now home from rehab. Wears TLSO brace and walking with walker. Xray lumbar spine from 5/2/23 demonstrated stability s/p L4-5 fusion. \par Plan made for her to RTC 1 month for f/u. \par \par 6/2/23 visit\par  pt returns for follow- up. \par She endorses back pain has improved post op but continues to have pain in her legs that radiates posterolaterally down legs to feet with numbness/tinging, right worse than left that is the same as pre- op. \par Also notes right leg weakness that is not new. \par Pain worse with ambulating or prolonged periods of sitting. \par Continues to weat back brace for support. \par Plan was made to continue PT/meds (flexeril/lidocaine) PRN pain and repeat CT L-spine in one month with new Xray.

## 2023-07-16 PROBLEM — Z98.1 S/P LUMBAR FUSION: Status: ACTIVE | Noted: 2023-07-05

## 2023-07-16 PROBLEM — Z86.39 HISTORY OF DIABETES MELLITUS: Status: RESOLVED | Noted: 2022-11-22 | Resolved: 2023-07-16

## 2023-07-16 PROBLEM — M43.17 SPONDYLOLISTHESIS OF LUMBOSACRAL REGION: Status: ACTIVE | Noted: 2021-11-18

## 2023-07-16 PROBLEM — Z86.79 HISTORY OF HYPERTENSION: Status: RESOLVED | Noted: 2022-11-22 | Resolved: 2023-07-16

## 2023-07-16 PROBLEM — M47.816 DEGENERATIVE ARTHRITIS OF LUMBAR SPINE: Status: ACTIVE | Noted: 2022-11-22

## 2023-07-17 ENCOUNTER — APPOINTMENT (OUTPATIENT)
Dept: NEUROSURGERY | Facility: CLINIC | Age: 58
End: 2023-07-17

## 2023-07-17 DIAGNOSIS — M47.816 SPONDYLOSIS W/OUT MYELOPATHY OR RADICULOPATHY, LUMBAR REGION: ICD-10-CM

## 2023-07-17 DIAGNOSIS — Z86.79 PERSONAL HISTORY OF OTHER DISEASES OF THE CIRCULATORY SYSTEM: ICD-10-CM

## 2023-07-17 DIAGNOSIS — Z98.1 ARTHRODESIS STATUS: ICD-10-CM

## 2023-07-17 DIAGNOSIS — M43.17 SPONDYLOLISTHESIS, LUMBOSACRAL REGION: ICD-10-CM

## 2023-07-17 DIAGNOSIS — Z86.39 PERSONAL HISTORY OF OTHER ENDOCRINE, NUTRITIONAL AND METABOLIC DISEASE: ICD-10-CM

## 2023-07-17 NOTE — HISTORY OF PRESENT ILLNESS
[de-identified] : 58 y/o female with PMHx HTN, DM, who presented with chronic back pain for several years. Described as low back pain radiating to RLE with numbness/tingling. Pain/numbness/tingling involves the entire leg to the foot. She also reports RLE weakness. She did physical therapy for several years which initially helped with symptoms but then was no longer effective. Also tried KEVAN x 2 without relief. MRI with severe L4-S1 b/I foraminal stenosis with L4-5 spondylolisthesis. 4/12/23 pt underwent L4-5 laminectomy and fusion. \par \par Hospital course uncomplicated and dc to [facility] 4/18/12. \par \par 5/2/23 post op: now home from rehab. Wears TLSO brace and walking with walker. Xray lumbar spine from 5/2/23 demonstrated stability s/p L4-5 fusion. \par Plan made for her to RTC 1 month for f/u. \par \par 6/2/23 visit\par  pt returns for follow- up. \par She endorses back pain has improved post op but continues to have pain in her legs that radiates posterolaterally down legs to feet with numbness/tinging, right worse than left that is the same as pre- op. \par Also notes right leg weakness that is not new. \par Pain worse with ambulating or prolonged periods of sitting. \par Continues to weat back brace for support. \par Plan was made to continue PT/meds (flexeril/lidocaine) PRN pain and repeat CT L-spine in one month with new Xray.\par \par 7/5/23 visit\par Xray reviewed which showed stable fusion. Plan was made to get CT L-spine to reevlaute fusion.

## 2023-07-17 NOTE — DATA REVIEWED
[de-identified] : L-spine wo on 7/12/23 @ [facility]\par \par EXAM:  CT LUMBAR SPINE WITHOUT CONTRAST\par \par Note - This patient has received 1 CT studies and 0 Myocardial Perfusion studies within our network over the previous 12 month period.\par \par HISTORY:  Low back pain. Postop\par \par TECHNIQUE:  CT was performed without contrast with axial multislice multidetector technique. Sagittal, coronal and axial reformatted images were then obtained. One or more of the following dose reduction techniques were used: automated exposure control, adjustment of the mA and/or kV according to patient size, use of iterative reconstruction technique. \par \par COMPARISON:  CT 2/7/23. MRI 10/22/2021\par \par FINDINGS:  \par \par For purposes of this dictation, the last well-formed disc space will be labeled L5-S1.\par \par Posterior spinal fusion hardware noted at the L4-L5 level. Hardware intact.\par Grade 1 anterolisthesis of L4 on L5. Grade 1 retrolisthesis of L5 on S1.\par Normal lumbar lordosis is preserved.\par No significant scoliosis.\par No spondylolysis.\par No compression deformity.\par There is degenerative change of the sacroiliac joints with vacuum phenomena.\par Mild disc space narrowing.\par Grossly unremarkable visualized soft tissue structures of the abdomen and pelvis.\par \par L1-L2\par No posterior disc contour abnormality. Normal facet joints. No central canal or foraminal stenosis.\par \par L2-L3\par No posterior disc contour abnormality. Normal facet joints. No central canal or foraminal stenosis.\par \par L3-L4\par Disc bulge eccentric to the left with mild facet joint arthrosis. Mild left foraminal narrowing, stable. No central canal or right foraminal stenosis.\par \par L4-L5\par There is uncovering of the disc with superimposed disc bulge and moderate facet joint arthrosis. There is severe left and moderate right foraminal narrowing impinging the exiting L4 nerve roots. No spinal canal stenosis status post interval laminectomies\par \par L5-S1\par Disc bulge and mild facet joint arthrosis, stable. No central canal or foraminal stenosis.\par \par \par IMPRESSION:  \par *   Interval posterior spinal fusion hardware placement at L4-L5.\par *   Grade 1 anterolisthesis of L4 on L5.\par *   Grade 1 retrolisthesis of L5 on S1.\par *   L4-L5 level significant foraminal narrowing impinging the exiting L4 nerve roots, stable. \par *   Additional mild lower lumbar spine degenerative changes, stable\par \par Thank you for the opportunity to participate in the care of this patient.  \par  \par STEPHEN CEJA MD  - Electronically Signed: 07- 9:50 AM \par Physician to Physician Direct Line is: [phone]

## 2023-07-17 NOTE — REASON FOR VISIT
[Home] : at home, [unfilled] , at the time of the visit. [Medical Office: ([facility])___] : at the medical office located in  [Patient] : the patient [Follow-Up: _____] : a [unfilled] follow-up visit [FreeTextEntry1] : s/p L4-5 decompression with posterior fusion on 4/12/23. Returns to review new CT Lspine for fusion evaluation,.

## 2023-09-27 NOTE — PHYSICAL EXAM
[General Appearance - Alert] : alert [General Appearance - In No Acute Distress] : in no acute distress [Clean] : clean [Dry] : dry [Healing Well] : healing well [No Drainage] : without drainage [Normal Skin] : normal [Oriented To Time, Place, And Person] : oriented to person, place, and time [Impaired Insight] : insight and judgment were intact [Affect] : the affect was normal [Memory Recent] : recent memory was not impaired [Sensation Tactile Decrease] : light touch was intact [Sclera] : the sclera and conjunctiva were normal [PERRL With Normal Accommodation] : pupils were equal in size, round, reactive to light, with normal accommodation [Neck Appearance] : the appearance of the neck was normal [Respiration, Rhythm And Depth] : normal respiratory rhythm and effort [] : no respiratory distress [Skin Color & Pigmentation] : normal skin color and pigmentation [Erythema] : not erythematous [Warm] : not warm [FreeTextEntry6] : RLU 4/5, pain limiting, otherwise 5/5.  Airway patent, TM normal bilaterally, normal appearing mouth, nose, throat, neck supple with full range of motion, no cervical adenopathy. VPS without swelling, redness, or tenderness, Airway patent, TM normal bilaterally, normal appearing mouth, nose, throat, neck supple with full range of motion, no cervical adenopathy.

## 2023-12-19 NOTE — PHYSICAL THERAPY INITIAL EVALUATION ADULT - DISCHARGE PLANNER MADE AWARE
GENERAL: NAD, lying in bed comfortably  HEAD:  Atraumatic, normocephalic  EYES: EOMI, PERRLA, conjunctiva and sclera clear  NECK: Supple, trachea midline, no JVD  HEART: Regular rate and rhythm, no murmurs, rubs, or gallops  LUNGS: Unlabored respirations.  Clear to auscultation bilaterally, no crackles, wheezing, or rhonchi  ABDOMEN: Soft, nontender, nondistended, +BS  EXTREMITIES: 2+ peripheral pulses bilaterally. No clubbing, cyanosis, or edema; right hand scratches and bites are healing well and bite on palmar surface on had is slightly edematous with no erythema or discharge and the wound is well approximated  NERVOUS SYSTEM:  A&Ox3, moving all extremities, no focal deficits   SKIN: No rashes or lesions yes

## 2024-05-07 ENCOUNTER — APPOINTMENT (OUTPATIENT)
Dept: ORTHOPEDIC SURGERY | Facility: CLINIC | Age: 59
End: 2024-05-07
Payer: COMMERCIAL

## 2024-05-07 VITALS — BODY MASS INDEX: 26.68 KG/M2 | HEIGHT: 62 IN | WEIGHT: 145 LBS | RESPIRATION RATE: 16 BRPM

## 2024-05-07 DIAGNOSIS — R20.0 ANESTHESIA OF SKIN: ICD-10-CM

## 2024-05-07 DIAGNOSIS — M19.031 PRIMARY OSTEOARTHRITIS, RIGHT WRIST: ICD-10-CM

## 2024-05-07 DIAGNOSIS — M79.642 PAIN IN LEFT HAND: ICD-10-CM

## 2024-05-07 PROCEDURE — 99243 OFF/OP CNSLTJ NEW/EST LOW 30: CPT

## 2024-05-07 PROCEDURE — 73130 X-RAY EXAM OF HAND: CPT | Mod: 50

## 2024-05-07 NOTE — PHYSICAL EXAM
[de-identified] : Physical exam shows the patient to be alert and oriented x3, capable of ambulation. The patient is well-developed and well-nourished in no apparent respiratory distress. Majority of the skin is intact bilaterally in the upper extremities without lymphadenopathy at the elbows.  There is a negative Spurling test. There is no sensitivity or Tinel's over the axilla bilaterally in the area of the brachial plexus.  The elbows have a symmetric and full range of motion with no pain upon forced flexion or extension bilaterally. There is no tenderness over the medial or lateral epicondyles bilaterally. The biceps and triceps are intact bilaterally with 5 over 5 strength. There is no joint effusion or instability of the medial and lateral collateral ligament complex bilaterally. There is no sensitivity of the median ulnar or radial nerves with provocative tests bilaterally.  There is tenderness over the right greater than left thumb CMC joint with positive grind test no tenderness over the STT radiocarpal and midcarpal joints no tenderness over the MP or IP joint.  Minimal tenderness over the A1 pulley of the thumbs bilaterally as well as FCR tendon.  There is 2+ pulses of the radial arteries bilaterally. Suresh's test shows excellent flow through the radial and ulnar arteries with an intact arch bilaterally.  There is positive Tinel's and Phalen's over the median nerves bilaterally with mild thenar atrophy but good opposition.   strength 20 pounds on the right 10 pounds on the left Right-hand-dominant  Sensation grossly intact in the median ulnar nerve distribution

## 2024-05-07 NOTE — HISTORY OF PRESENT ILLNESS
[Right] : right hand dominant [FreeTextEntry1] : Patient presents with the right CMC joint pain, left volar hand pain and bilateral hand numbness for over a year.  Patient has tried splinting with no improvement.  She states that she carries a lot of heavy boxes at work and is unsure if that is the cause of the left hand pain.  She is also complaining of night pain which is improved by shaking her hand.  She has tried rest activity modifications without significant improvement over time.

## 2024-05-07 NOTE — ASSESSMENT
[FreeTextEntry1] : Patient's chief complaints appear to be coming from a right and left basal joint arthritis with minimal tendinitis but significant carpal tunnel syndrome.  Options were discussed ranging conservative management, therapy, splinting, injections, or surgery.

## 2024-07-24 PROBLEM — Z86.39 HISTORY OF DIABETES MELLITUS: Status: RESOLVED | Noted: 2022-11-22 | Resolved: 2024-07-24

## 2024-07-24 PROBLEM — Z86.79 HISTORY OF HYPERTENSION: Status: RESOLVED | Noted: 2022-11-22 | Resolved: 2024-07-24

## 2024-07-29 ENCOUNTER — APPOINTMENT (OUTPATIENT)
Dept: NEUROSURGERY | Facility: CLINIC | Age: 59
End: 2024-07-29

## 2024-07-29 ENCOUNTER — OUTPATIENT (OUTPATIENT)
Dept: OUTPATIENT SERVICES | Facility: HOSPITAL | Age: 59
LOS: 1 days | End: 2024-07-29
Payer: COMMERCIAL

## 2024-07-29 VITALS
HEART RATE: 67 BPM | RESPIRATION RATE: 18 BRPM | OXYGEN SATURATION: 99 % | BODY MASS INDEX: 27.75 KG/M2 | HEIGHT: 61 IN | SYSTOLIC BLOOD PRESSURE: 126 MMHG | DIASTOLIC BLOOD PRESSURE: 84 MMHG | TEMPERATURE: 97.6 F | WEIGHT: 147 LBS

## 2024-07-29 DIAGNOSIS — M54.16 RADICULOPATHY, LUMBAR REGION: ICD-10-CM

## 2024-07-29 DIAGNOSIS — Z86.39 PERSONAL HISTORY OF OTHER ENDOCRINE, NUTRITIONAL AND METABOLIC DISEASE: ICD-10-CM

## 2024-07-29 DIAGNOSIS — Z98.1 ARTHRODESIS STATUS: ICD-10-CM

## 2024-07-29 DIAGNOSIS — Z98.891 HISTORY OF UTERINE SCAR FROM PREVIOUS SURGERY: Chronic | ICD-10-CM

## 2024-07-29 DIAGNOSIS — Z86.79 PERSONAL HISTORY OF OTHER DISEASES OF THE CIRCULATORY SYSTEM: ICD-10-CM

## 2024-07-29 DIAGNOSIS — M43.17 SPONDYLOLISTHESIS, LUMBOSACRAL REGION: ICD-10-CM

## 2024-07-29 DIAGNOSIS — Z98.890 OTHER SPECIFIED POSTPROCEDURAL STATES: Chronic | ICD-10-CM

## 2024-07-29 PROCEDURE — 72110 X-RAY EXAM L-2 SPINE 4/>VWS: CPT | Mod: 26

## 2024-07-29 PROCEDURE — 99215 OFFICE O/P EST HI 40 MIN: CPT

## 2024-07-29 PROCEDURE — 72110 X-RAY EXAM L-2 SPINE 4/>VWS: CPT

## 2024-07-29 NOTE — DATA REVIEWED
[de-identified] : L-spine wo on 7/12/23 @ [facility]\par  \par  EXAM:  CT LUMBAR SPINE WITHOUT CONTRAST\par  \par  Note - This patient has received 1 CT studies and 0 Myocardial Perfusion studies within our network over the previous 12 month period.\par  \par  HISTORY:  Low back pain. Postop\par  \par  TECHNIQUE:  CT was performed without contrast with axial multislice multidetector technique. Sagittal, coronal and axial reformatted images were then obtained. One or more of the following dose reduction techniques were used: automated exposure control, adjustment of the mA and/or kV according to patient size, use of iterative reconstruction technique. \par  \par  COMPARISON:  CT 2/7/23. MRI 10/22/2021\par  \par  FINDINGS:  \par  \par  For purposes of this dictation, the last well-formed disc space will be labeled L5-S1.\par  \par  Posterior spinal fusion hardware noted at the L4-L5 level. Hardware intact.\par  Grade 1 anterolisthesis of L4 on L5. Grade 1 retrolisthesis of L5 on S1.\par  Normal lumbar lordosis is preserved.\par  No significant scoliosis.\par  No spondylolysis.\par  No compression deformity.\par  There is degenerative change of the sacroiliac joints with vacuum phenomena.\par  Mild disc space narrowing.\par  Grossly unremarkable visualized soft tissue structures of the abdomen and pelvis.\par  \par  L1-L2\par  No posterior disc contour abnormality. Normal facet joints. No central canal or foraminal stenosis.\par  \par  L2-L3\par  No posterior disc contour abnormality. Normal facet joints. No central canal or foraminal stenosis.\par  \par  L3-L4\par  Disc bulge eccentric to the left with mild facet joint arthrosis. Mild left foraminal narrowing, stable. No central canal or right foraminal stenosis.\par  \par  L4-L5\par  There is uncovering of the disc with superimposed disc bulge and moderate facet joint arthrosis. There is severe left and moderate right foraminal narrowing impinging the exiting L4 nerve roots. No spinal canal stenosis status post interval laminectomies\par  \par  L5-S1\par  Disc bulge and mild facet joint arthrosis, stable. No central canal or foraminal stenosis.\par  \par  \par  IMPRESSION:  \par  *   Interval posterior spinal fusion hardware placement at L4-L5.\par  *   Grade 1 anterolisthesis of L4 on L5.\par  *   Grade 1 retrolisthesis of L5 on S1.\par  *   L4-L5 level significant foraminal narrowing impinging the exiting L4 nerve roots, stable. \par  *   Additional mild lower lumbar spine degenerative changes, stable\par  \par  Thank you for the opportunity to participate in the care of this patient.  \par   \par  STEPHEN CEJA MD  - Electronically Signed: 07- 9:50 AM \par  Physician to Physician Direct Line is: [phone]

## 2024-07-29 NOTE — PHYSICAL EXAM
[General Appearance - Alert] : alert [General Appearance - In No Acute Distress] : in no acute distress [General Appearance - Well Nourished] : well nourished [General Appearance - Well-Appearing] : healthy appearing [Oriented To Time, Place, And Person] : oriented to person, place, and time [Impaired Insight] : insight and judgment were intact [Affect] : the affect was normal [Memory Recent] : recent memory was not impaired [4] : C8 finger flexors 4/5 [5] : S1 toe walking 5/5 [Abnormal Walk] : normal gait [1+] : Ankle jerk left 1+ [Romberg's Sign] : Romberg's sign was negtive [Pretty] : Pretty's sign was not demonstrated

## 2024-07-29 NOTE — HISTORY OF PRESENT ILLNESS
[de-identified] : 58 y/o female with PMHx HTN, DM, who presented with chronic back pain for several years. Described as low back pain radiating to RLE with numbness/tingling. Pain/numbness/tingling involves the entire leg to the foot. She also reports RLE weakness. She did physical therapy for several years which initially helped with symptoms but then was no longer effective. Also tried KEVAN x 2 without relief. MRI with severe L4-S1 b/I foraminal stenosis with L4-5 spondylolisthesis. 4/12/23 pt underwent L4-5 laminectomy and fusion.   Hospital course uncomplicated and dc to [facility] 4/18/12.   5/2/23 post op: now home from rehab. Wears TLSO brace and walking with walker. Xray lumbar spine from 5/2/23 demonstrated stability s/p L4-5 fusion.  Plan made for her to RTC 1 month for f/u.   6/2/23 visit  pt returns for follow- up.  She endorses back pain has improved post op but continues to have pain in her legs that radiates posterolaterally down legs to feet with numbness/tinging, right worse than left that is the same as pre- op.  Also notes right leg weakness that is not new.  Pain worse with ambulating or prolonged periods of sitting.  Continues to weat back brace for support.  Plan was made to continue PT/meds (flexeril/lidocaine) PRN pain and repeat CT L-spine in one month with new Xray.   [FreeTextEntry1] : Today she reports progressively worsening b/l hands numbness/pain/paresthesia causing moderate difficulty performing ADLs (grabbing objects) and persistent back to RLE (posterior/lateral down to calf) pain and b/l feet numbness worsening by prolonged sitting/standing. Currently she is getting OT with minimal relief. She reports had EMG done for her upper extremities recently which showed b/l carpal tunnel syndrome. She was aware to get lower extremities EMG as well but has not been completed. She reports moderate weakness on b/l hands (right handed) but denies balance problem , BB dysfunction, saddle anesthesia.

## 2024-07-29 NOTE — ASSESSMENT
[FreeTextEntry1] : EMG showed R side carpal tunnel syndrome,  Given EMG finding and current symptoms, surgical intervention of  carpal tunnel releaste is recommended. Risks, benefit and alternative to the surgery was discussed with the patient. She verbalizes understanding and would like to proceed.  PLAN - tentative surgery date on 9/4/2024 - medical/cardiac clearance (clearance packet was provided today) - EMG LE - pain management for trigger point injection trials for lumbar spine  I, Dr. Kai Penny, personally performed the evaluation and management (E/M) services for this established patient who presents today with (a) new problem(s)/exacerbation of (an) existing condition(s). That E/M includes conducting the clinically appropriate interval history &/or exam, assessing all new/exacerbated conditions, and establishing a new plan of care. Today, my HARDEEP, Hyunchu Penelope-Gold, was here to observe my evaluation and management service for this new problem/exacerbated condition and follow the plan of care established by me going forward.

## 2024-07-29 NOTE — HISTORY OF PRESENT ILLNESS
[de-identified] : 58 y/o female with PMHx HTN, DM, who presented with chronic back pain for several years. Described as low back pain radiating to RLE with numbness/tingling. Pain/numbness/tingling involves the entire leg to the foot. She also reports RLE weakness. She did physical therapy for several years which initially helped with symptoms but then was no longer effective. Also tried KEVAN x 2 without relief. MRI with severe L4-S1 b/I foraminal stenosis with L4-5 spondylolisthesis. 4/12/23 pt underwent L4-5 laminectomy and fusion.   Hospital course uncomplicated and dc to [facility] 4/18/12.   5/2/23 post op: now home from rehab. Wears TLSO brace and walking with walker. Xray lumbar spine from 5/2/23 demonstrated stability s/p L4-5 fusion.  Plan made for her to RTC 1 month for f/u.   6/2/23 visit  pt returns for follow- up.  She endorses back pain has improved post op but continues to have pain in her legs that radiates posterolaterally down legs to feet with numbness/tinging, right worse than left that is the same as pre- op.  Also notes right leg weakness that is not new.  Pain worse with ambulating or prolonged periods of sitting.  Continues to weat back brace for support.  Plan was made to continue PT/meds (flexeril/lidocaine) PRN pain and repeat CT L-spine in one month with new Xray.   [FreeTextEntry1] : Today she reports progressively worsening b/l hands numbness/pain/paresthesia causing moderate difficulty performing ADLs (grabbing objects) and persistent back to RLE (posterior/lateral down to calf) pain and b/l feet numbness worsening by prolonged sitting/standing. Currently she is getting OT with minimal relief. She reports had EMG done for her upper extremities recently which showed b/l carpal tunnel syndrome. She was aware to get lower extremities EMG as well but has not been completed. She reports moderate weakness on b/l hands (right handed) but denies balance problem , BB dysfunction, saddle anesthesia.

## 2024-07-29 NOTE — DATA REVIEWED
[de-identified] : L-spine wo on 7/12/23 @ [facility]\par  \par  EXAM:  CT LUMBAR SPINE WITHOUT CONTRAST\par  \par  Note - This patient has received 1 CT studies and 0 Myocardial Perfusion studies within our network over the previous 12 month period.\par  \par  HISTORY:  Low back pain. Postop\par  \par  TECHNIQUE:  CT was performed without contrast with axial multislice multidetector technique. Sagittal, coronal and axial reformatted images were then obtained. One or more of the following dose reduction techniques were used: automated exposure control, adjustment of the mA and/or kV according to patient size, use of iterative reconstruction technique. \par  \par  COMPARISON:  CT 2/7/23. MRI 10/22/2021\par  \par  FINDINGS:  \par  \par  For purposes of this dictation, the last well-formed disc space will be labeled L5-S1.\par  \par  Posterior spinal fusion hardware noted at the L4-L5 level. Hardware intact.\par  Grade 1 anterolisthesis of L4 on L5. Grade 1 retrolisthesis of L5 on S1.\par  Normal lumbar lordosis is preserved.\par  No significant scoliosis.\par  No spondylolysis.\par  No compression deformity.\par  There is degenerative change of the sacroiliac joints with vacuum phenomena.\par  Mild disc space narrowing.\par  Grossly unremarkable visualized soft tissue structures of the abdomen and pelvis.\par  \par  L1-L2\par  No posterior disc contour abnormality. Normal facet joints. No central canal or foraminal stenosis.\par  \par  L2-L3\par  No posterior disc contour abnormality. Normal facet joints. No central canal or foraminal stenosis.\par  \par  L3-L4\par  Disc bulge eccentric to the left with mild facet joint arthrosis. Mild left foraminal narrowing, stable. No central canal or right foraminal stenosis.\par  \par  L4-L5\par  There is uncovering of the disc with superimposed disc bulge and moderate facet joint arthrosis. There is severe left and moderate right foraminal narrowing impinging the exiting L4 nerve roots. No spinal canal stenosis status post interval laminectomies\par  \par  L5-S1\par  Disc bulge and mild facet joint arthrosis, stable. No central canal or foraminal stenosis.\par  \par  \par  IMPRESSION:  \par  *   Interval posterior spinal fusion hardware placement at L4-L5.\par  *   Grade 1 anterolisthesis of L4 on L5.\par  *   Grade 1 retrolisthesis of L5 on S1.\par  *   L4-L5 level significant foraminal narrowing impinging the exiting L4 nerve roots, stable. \par  *   Additional mild lower lumbar spine degenerative changes, stable\par  \par  Thank you for the opportunity to participate in the care of this patient.  \par   \par  STEPHEN CEJA MD  - Electronically Signed: 07- 9:50 AM \par  Physician to Physician Direct Line is: [phone]

## 2024-07-29 NOTE — REASON FOR VISIT
[Follow-Up: _____] : a [unfilled] follow-up visit [FreeTextEntry1] : s/p L4-5 decompression with posterior fusion on 4/12/23. Returns to review new CT Lspine (7/2023 @ [facility], patient did not follow up after CT) for fusion evaluation.

## 2024-07-29 NOTE — REASON FOR VISIT
[Follow-Up: _____] : a [unfilled] follow-up visit [FreeTextEntry1] : s/p L4-5 decompression with posterior fusion on 4/12/23. Returns to review new CT Lspine (7/2023 @ [facility], patient did not follow up after CT) for fusion evaluation.

## 2024-08-22 NOTE — PHYSICAL THERAPY INITIAL EVALUATION ADULT - THERAPY FREQUENCY, PT EVAL
Online resources:  Information about environmental allergies:  https://[facility].org/allergies/types/hay-fever-rhinitis  https://[facility].org/allergies/management-treatment/living-with-allergies/environmental-allergy-avoidance/    Information about non-allergic or vasomotor rhinitis:  https://www.[employer].org/tools-for-the-public/allergy,-asthma-immunology-glossary/ahgbeduozds-vcqbnlua-hheqtbevb-defined    HEPA air filters:  https://[facility].org/allergies/management-treatment/living-with-allergies/air-filters/    Sinus rinse recipe (use [employer] bottle, not bulb):  https://www.[facility].org/conditions-and-treatments/library/allergy-library/saline-sinus-rinse-recipe  http://[employer]/Products/Sinus-Rinse/Sinus-Rinse-Regular-Kit    Sinus infections:  https://[facility].org/allergies/allergic-conditions/sinus-infection/    Information about allergen immunotherapy:  https://www.[facility].org/tools-for-the-public/conditions-library/allergies/immunotherapy-can-provide-lasting-relief  
daily

## 2024-09-03 ENCOUNTER — TRANSCRIPTION ENCOUNTER (OUTPATIENT)
Age: 59
End: 2024-09-03

## 2024-09-03 VITALS
RESPIRATION RATE: 16 BRPM | HEIGHT: 61 IN | OXYGEN SATURATION: 99 % | TEMPERATURE: 98 F | WEIGHT: 148.15 LBS | DIASTOLIC BLOOD PRESSURE: 81 MMHG | HEART RATE: 69 BPM | SYSTOLIC BLOOD PRESSURE: 127 MMHG

## 2024-09-03 NOTE — ASU PATIENT PROFILE, ADULT - NSICDXPASTMEDICALHX_GEN_ALL_CORE_FT
PAST MEDICAL HISTORY:  Asthma     Chronic sinusitis     History of palpitations     HTN (hypertension)     Lumbar radiculopathy     Type 2 diabetes mellitus

## 2024-09-03 NOTE — ASU PREOP CHECKLIST - BMI (KG/M2)
From: Manju Rankin  To: Jorge Lobo  Sent: 7/28/2022 5:15 PM CDT  Subject: ultrasound results    I would like to share the results of the ultrasound with my daughter who is a physician. Will it be listed on the portal, or can one of your staff send it directly to her?  Thank you,  Manju Rankin  
[phone]

## 2024-09-03 NOTE — ASU PATIENT PROFILE, ADULT - NS PRO TALK SOMEONE YN
Faxed forms, sent a copy to be scanned into chart and mailed a copy to patients home address.  NAZANIN Pink MA July 19, 2017 11:57 AM     no

## 2024-09-03 NOTE — ASU PATIENT PROFILE, ADULT - AS SC BRADEN FRICTION
OTOLARYNGOLOGY OPERATIVE NOTE    SURGEON: Kasi Watson.    ASSISTANT: none     PREOPERATIVE DIAGNOSIS: Chronic otitis media     POSTOPERATIVE DIAGNOSIS: Chronic otitis media.     SURGERY: Bilateral myringotomy with #1 Paparella type tube placement.     FINDINGS: serous fluid.    INDICATIONS: Above findings with serous fluid in the middle ear space.     BRIEF HISTORY: Patient is a 19 yo with a history of serous otitis media that was resistant to maximal medical therapy. The family understands the risks and benefits of the surgery as well as alternatives, wishes to have it done and has agree to it.     DESCRIPTION OF PROCEDURE: The patient was taken to the OR, placed under general mask anesthetic, appropriately positioned, prepped and draped. We examined the left ear under the microscope. Cerumen was removed with a cerumen curet. TM was retracted. Myringotomy was made anteriorly in a radial fashion close to umbo. A small amount of serous fluid was suctioned, followed by placement of a #1 Paparella type tube. We next turned our attention to the right ear. We examined the right ear under the microscope. Again, cerumen was removed with a cerumen curet. TM was retracted. Myringotomy was made anteriorly in a radial fashion close to umbo. A large amount of serous  fluid was suctioned, followed by placement of a #1 Paparella type tube. The patient tolerated procedure well and was taken back to Recovery in stable condition.     KASI WATSON MD   
(3) no apparent problem

## 2024-09-04 ENCOUNTER — OUTPATIENT (OUTPATIENT)
Dept: OUTPATIENT SERVICES | Facility: HOSPITAL | Age: 59
LOS: 1 days | Discharge: HOME | End: 2024-09-04
Payer: COMMERCIAL

## 2024-09-04 ENCOUNTER — TRANSCRIPTION ENCOUNTER (OUTPATIENT)
Age: 59
End: 2024-09-04

## 2024-09-04 ENCOUNTER — APPOINTMENT (OUTPATIENT)
Dept: NEUROSURGERY | Facility: HOSPITAL | Age: 59
End: 2024-09-04

## 2024-09-04 VITALS
OXYGEN SATURATION: 98 % | HEART RATE: 59 BPM | DIASTOLIC BLOOD PRESSURE: 77 MMHG | SYSTOLIC BLOOD PRESSURE: 118 MMHG | RESPIRATION RATE: 16 BRPM

## 2024-09-04 DIAGNOSIS — Z98.890 OTHER SPECIFIED POSTPROCEDURAL STATES: Chronic | ICD-10-CM

## 2024-09-04 DIAGNOSIS — Z98.891 HISTORY OF UTERINE SCAR FROM PREVIOUS SURGERY: Chronic | ICD-10-CM

## 2024-09-04 LAB — GLUCOSE BLDC GLUCOMTR-MCNC: 98 MG/DL — SIGNIFICANT CHANGE UP (ref 70–99)

## 2024-09-04 PROCEDURE — C1889: CPT

## 2024-09-04 PROCEDURE — 82962 GLUCOSE BLOOD TEST: CPT

## 2024-09-04 PROCEDURE — 64721 CARPAL TUNNEL SURGERY: CPT | Mod: RT

## 2024-09-04 DEVICE — SURGIFLO HEMOSTATIC MATRIX KIT: Type: IMPLANTABLE DEVICE | Site: RIGHT | Status: FUNCTIONAL

## 2024-09-04 RX ORDER — ACETAMINOPHEN 325 MG/1
1000 TABLET ORAL EVERY 8 HOURS
Refills: 0 | Status: ACTIVE | OUTPATIENT
Start: 2024-09-04 | End: 2025-08-03

## 2024-09-04 RX ORDER — APREPITANT 40 MG/1
40 CAPSULE ORAL ONCE
Refills: 0 | Status: COMPLETED | OUTPATIENT
Start: 2024-09-04 | End: 2024-09-04

## 2024-09-04 RX ORDER — CHLORHEXIDINE GLUCONATE 40 MG/ML
1 SOLUTION TOPICAL ONCE
Refills: 0 | Status: COMPLETED | OUTPATIENT
Start: 2024-09-04 | End: 2024-09-04

## 2024-09-04 RX ORDER — HYDROMORPHONE HYDROCHLORIDE 2 MG/1
0.5 TABLET ORAL EVERY 6 HOURS
Refills: 0 | Status: ACTIVE | OUTPATIENT
Start: 2024-09-04 | End: 2024-09-11

## 2024-09-04 RX ORDER — OXYCODONE HYDROCHLORIDE 5 MG/1
5 TABLET ORAL EVERY 4 HOURS
Refills: 0 | Status: ACTIVE | OUTPATIENT
Start: 2024-09-04 | End: 2024-09-11

## 2024-09-04 RX ORDER — DILTIAZEM HYDROCHLORIDE 5 MG/ML
1 INJECTION INTRAVENOUS
Refills: 0 | DISCHARGE

## 2024-09-04 RX ORDER — ACETAMINOPHEN 325 MG/1
1000 TABLET ORAL ONCE
Refills: 0 | Status: COMPLETED | OUTPATIENT
Start: 2024-09-04 | End: 2024-09-04

## 2024-09-04 RX ORDER — SODIUM CHLORIDE 9 MG/ML
1000 INJECTION INTRAMUSCULAR; INTRAVENOUS; SUBCUTANEOUS
Refills: 0 | Status: DISCONTINUED | OUTPATIENT
Start: 2024-09-04 | End: 2024-09-04

## 2024-09-04 RX ORDER — SEMAGLUTIDE 1 MG/.5ML
0.5 INJECTION, SOLUTION SUBCUTANEOUS
Refills: 0 | DISCHARGE

## 2024-09-04 RX ORDER — OXYCODONE AND ACETAMINOPHEN 7.5; 325 MG/1; MG/1
1 TABLET ORAL
Qty: 6 | Refills: 0
Start: 2024-09-04 | End: 2024-09-04

## 2024-09-04 RX ORDER — OXYCODONE AND ACETAMINOPHEN 7.5; 325 MG/1; MG/1
1 TABLET ORAL
Qty: 12 | Refills: 0
Start: 2024-09-04 | End: 2024-09-05

## 2024-09-04 RX ORDER — ONDANSETRON 2 MG/ML
4 INJECTION, SOLUTION INTRAMUSCULAR; INTRAVENOUS EVERY 6 HOURS
Refills: 0 | Status: ACTIVE | OUTPATIENT
Start: 2024-09-04 | End: 2025-08-03

## 2024-09-04 RX ORDER — METOPROLOL TARTRATE 100 MG/1
1 TABLET ORAL
Refills: 0 | DISCHARGE

## 2024-09-04 RX ORDER — OXYCODONE HYDROCHLORIDE 5 MG/1
10 TABLET ORAL EVERY 4 HOURS
Refills: 0 | Status: ACTIVE | OUTPATIENT
Start: 2024-09-04 | End: 2024-09-11

## 2024-09-04 RX ADMIN — APREPITANT 40 MILLIGRAM(S): 40 CAPSULE ORAL at 10:32

## 2024-09-04 RX ADMIN — ACETAMINOPHEN 1000 MILLIGRAM(S): 325 TABLET ORAL at 10:33

## 2024-09-04 RX ADMIN — CHLORHEXIDINE GLUCONATE 1 APPLICATION(S): 40 SOLUTION TOPICAL at 10:11

## 2024-09-04 RX ADMIN — HYDROMORPHONE HYDROCHLORIDE 0.5 MILLIGRAM(S): 2 TABLET ORAL at 13:59

## 2024-09-04 RX ADMIN — HYDROMORPHONE HYDROCHLORIDE 0.5 MILLIGRAM(S): 2 TABLET ORAL at 14:30

## 2024-09-04 NOTE — ASU DISCHARGE PLAN (ADULT/PEDIATRIC) - PROVIDER TOKENS
PROVIDER:[TOKEN:[[id number]],SCHEDULEDAPPT:[09/06/2024],SCHEDULEDAPPTTIME:[07:30 AM],ESTABLISHEDPATIENT:[T]]

## 2024-09-04 NOTE — BRIEF OPERATIVE NOTE - COMMENTS
should see Dr. Penny in the office on Friday to remove dressing    INCOMPLETE pending should see Dr. Penny in the office on Monday to remove dressing    INCOMPLETE pending should see Dr. Penny in the office on Monday to remove dressing

## 2024-09-04 NOTE — ASU DISCHARGE PLAN (ADULT/PEDIATRIC) - NS MD DC FALL RISK RISK
For information on Fall & Injury Prevention, visit: [email or web address] OR  [email or web address] OR  https://www.cdc.gov/steadi/patient.html

## 2024-09-04 NOTE — ASU DISCHARGE PLAN (ADULT/PEDIATRIC) - CARE PROVIDER_API CALL
Kai Penny.  Neurosurgery  [address] [place] [address]  Phone: [phone]  Fax: [phone]  Established Patient  Scheduled Appointment: 09/06/2024 07:30 AM

## 2024-09-04 NOTE — ASU DISCHARGE PLAN (ADULT/PEDIATRIC) - ASU DC SPECIAL INSTRUCTIONSFT
Do not bathe for 2 days. After 2 days see Dr. Penny in the office to have dressing removed.    Do not scrub incision, allow water to wash over the incision. Do not submerge in water for 14 days. Call the office with any redness, burning, drainage, wound opening, worsening pain, redness, fevers. Take pain meds as prescribed. See Dr. Penny in the office FRIDAY (48 hrs) to have dressing removed and incision examined Do not bathe for 2 days. Monday 9/9, pt should see Dr. Penny in the office to have dressing removed.    Do not scrub incision, allow water to wash over the incision. Do not submerge in water for 14 days. Call the office with any redness, burning, drainage, wound opening, worsening pain, redness, fevers. Take pain meds as prescribed. See Dr. Penny in the office Monday to have dressing removed and incision examined    MAY ALTERNATE PERCOCET WITH TYLENOL EVERY 4 HOURS. SHOULD NOT TAKE TYLENOL AND PERCOCET AT THE SAME TIME Do not bathe for 2 days. Monday 9/9, pt should see Dr. Penny in the office to have dressing removed.    Do not scrub incision, allow water to wash over the incision. Do not submerge in water for 14 days. Call the office with any redness, burning, drainage, wound opening, worsening pain, redness, fevers. Take pain meds as prescribed. See Dr. Penny in the office Monday to have dressing removed and incision examined    MAY ALTERNATE PERCOCET WITH TYLENOL EVERY 4 HOURS. PERCOCET MAY BE SPLIT IF NEEDED. SHOULD NOT TAKE TYLENOL AND PERCOCET AT THE SAME TIME    Percocet 5mg should be picked up from [facility] pharmacy at [facility] today postop.

## 2024-09-06 PROBLEM — Z86.39 HISTORY OF DIABETES MELLITUS: Status: RESOLVED | Noted: 2022-11-22 | Resolved: 2024-09-06

## 2024-09-06 PROBLEM — G56.00 CARPAL TUNNEL SYNDROME: Status: ACTIVE | Noted: 2024-09-06

## 2024-09-06 PROBLEM — Z86.79 HISTORY OF HYPERTENSION: Status: RESOLVED | Noted: 2022-11-22 | Resolved: 2024-09-06

## 2024-09-09 ENCOUNTER — NON-APPOINTMENT (OUTPATIENT)
Age: 59
End: 2024-09-09

## 2024-09-09 ENCOUNTER — APPOINTMENT (OUTPATIENT)
Dept: NEUROSURGERY | Facility: CLINIC | Age: 59
End: 2024-09-09
Payer: COMMERCIAL

## 2024-09-09 VITALS
HEIGHT: 61 IN | DIASTOLIC BLOOD PRESSURE: 71 MMHG | HEART RATE: 59 BPM | BODY MASS INDEX: 27.75 KG/M2 | TEMPERATURE: 98.1 F | SYSTOLIC BLOOD PRESSURE: 109 MMHG | WEIGHT: 147 LBS | OXYGEN SATURATION: 98 % | RESPIRATION RATE: 18 BRPM

## 2024-09-09 DIAGNOSIS — Z98.1 ARTHRODESIS STATUS: ICD-10-CM

## 2024-09-09 DIAGNOSIS — M43.17 SPONDYLOLISTHESIS, LUMBOSACRAL REGION: ICD-10-CM

## 2024-09-09 DIAGNOSIS — M47.816 SPONDYLOSIS W/OUT MYELOPATHY OR RADICULOPATHY, LUMBAR REGION: ICD-10-CM

## 2024-09-09 PROBLEM — Z98.890 S/P CARPAL TUNNEL RELEASE: Status: ACTIVE | Noted: 2024-09-09

## 2024-09-09 PROCEDURE — 99024 POSTOP FOLLOW-UP VISIT: CPT

## 2024-09-09 RX ORDER — GABAPENTIN 400 MG/1
400 CAPSULE ORAL 3 TIMES DAILY
Qty: 90 | Refills: 1 | Status: ACTIVE | COMMUNITY
Start: 2024-09-09 | End: 1900-01-01

## 2024-09-09 NOTE — REASON FOR VISIT
[Follow-Up: _____] : a [unfilled] follow-up visit [de-identified] : RIGHT carpal tunnel release [de-identified] : 9/4/2024 [FreeTextEntry1] : s/p R CTR for carpal tunnel syndrome.

## 2024-09-09 NOTE — HISTORY OF PRESENT ILLNESS
[FreeTextEntry1] : Today she reports moderate R wrist pain since the surgery which has been controlled well with medications (oxycodone) and denies fever/chills, cp, sob, dizziness, n/v, new/worsening focal neuro deficits.  [de-identified] : 56 y/o female with PMHx HTN, DM, who presented with chronic back pain for several years. Described as low back pain radiating to RLE with numbness/tingling. Pain/numbness/tingling involves the entire leg to the foot. She also reports RLE weakness. She did physical therapy for several years which initially helped with symptoms but then was no longer effective. Also tried KEVAN x 2 without relief. MRI with severe L4-S1 b/I foraminal stenosis with L4-5 spondylolisthesis. 4/12/23 pt underwent L4-5 laminectomy and fusion.   Hospital course uncomplicated and dc to [facility] 4/18/12.   5/2/23 post op: now home from rehab. Wears TLSO brace and walking with walker. Xray lumbar spine from 5/2/23 demonstrated stability s/p L4-5 fusion.  Plan made for her to RTC 1 month for f/u.   6/2/23 visit  pt returns for follow- up.  She endorses back pain has improved post op but continues to have pain in her legs that radiates posterolaterally down legs to feet with numbness/tinging, right worse than left that is the same as pre- op.  Also notes right leg weakness that is not new.  Pain worse with ambulating or prolonged periods of sitting.  Continues to weat back brace for support.  Plan was made to continue PT/meds (flexeril/lidocaine) PRN pain and repeat CT L-spine in one month with new Xray.  7/29/24 follow up visit  Returns to review new CT Lspine (7/2023 @ [facility], patient did not follow up after CT) for fusion evaluation.  Today she reports progressively worsening b/l hands numbness/pain/paresthesia causing moderate difficulty performing ADLs (grabbing objects) and persistent back to RLE (posterior/lateral down to calf) pain and b/l feet numbness worsening by prolonged sitting/standing. Currently she is getting OT with minimal relief. She reports had EMG done for her upper extremities recently which showed b/l carpal tunnel syndrome. She was aware to get lower extremities EMG as well but has not been completed. She reports moderate weakness on b/l hands (right handed) but denies balance problem , BB dysfunction, saddle anesthesia. EMG upper extremities showed R CTS and recommended CTR surgery. Tentative surgery date scheduled on 9/4/24 and plan was made to obtain lower extremities EMG as well as trigger point injection trials

## 2024-09-09 NOTE — PHYSICAL EXAM
[General Appearance - Alert] : alert [General Appearance - In No Acute Distress] : in no acute distress [] : normal voice and communication [Longitudinal] : longitudinal [Healing Well] : healing well [Sutures Intact] : closed with intact sutures [No Drainage] : without drainage [Normal Skin] : normal [Erythema] : not erythematous [Tender] : not tender [Warm] : not warm [Indurated] : not indurated [Fluctuant] : not fluctuant [FreeTextEntry1] : R wrist [Oriented To Time, Place, And Person] : oriented to person, place, and time [Impaired Insight] : insight and judgment were intact [Affect] : the affect was normal [Memory Recent] : recent memory was not impaired

## 2024-09-09 NOTE — REASON FOR VISIT
[Follow-Up: _____] : a [unfilled] follow-up visit [de-identified] : RIGHT carpal tunnel release [de-identified] : 9/4/2024 [FreeTextEntry1] : s/p R CTR for carpal tunnel syndrome.

## 2024-09-09 NOTE — HISTORY OF PRESENT ILLNESS
[FreeTextEntry1] : Today she reports moderate R wrist pain since the surgery which has been controlled well with medications (oxycodone) and denies fever/chills, cp, sob, dizziness, n/v, new/worsening focal neuro deficits.  [de-identified] : 56 y/o female with PMHx HTN, DM, who presented with chronic back pain for several years. Described as low back pain radiating to RLE with numbness/tingling. Pain/numbness/tingling involves the entire leg to the foot. She also reports RLE weakness. She did physical therapy for several years which initially helped with symptoms but then was no longer effective. Also tried KEVAN x 2 without relief. MRI with severe L4-S1 b/I foraminal stenosis with L4-5 spondylolisthesis. 4/12/23 pt underwent L4-5 laminectomy and fusion.   Hospital course uncomplicated and dc to [facility] 4/18/12.   5/2/23 post op: now home from rehab. Wears TLSO brace and walking with walker. Xray lumbar spine from 5/2/23 demonstrated stability s/p L4-5 fusion.  Plan made for her to RTC 1 month for f/u.   6/2/23 visit  pt returns for follow- up.  She endorses back pain has improved post op but continues to have pain in her legs that radiates posterolaterally down legs to feet with numbness/tinging, right worse than left that is the same as pre- op.  Also notes right leg weakness that is not new.  Pain worse with ambulating or prolonged periods of sitting.  Continues to weat back brace for support.  Plan was made to continue PT/meds (flexeril/lidocaine) PRN pain and repeat CT L-spine in one month with new Xray.  7/29/24 follow up visit  Returns to review new CT Lspine (7/2023 @ [facility], patient did not follow up after CT) for fusion evaluation.  Today she reports progressively worsening b/l hands numbness/pain/paresthesia causing moderate difficulty performing ADLs (grabbing objects) and persistent back to RLE (posterior/lateral down to calf) pain and b/l feet numbness worsening by prolonged sitting/standing. Currently she is getting OT with minimal relief. She reports had EMG done for her upper extremities recently which showed b/l carpal tunnel syndrome. She was aware to get lower extremities EMG as well but has not been completed. She reports moderate weakness on b/l hands (right handed) but denies balance problem , BB dysfunction, saddle anesthesia. EMG upper extremities showed R CTS and recommended CTR surgery. Tentative surgery date scheduled on 9/4/24 and plan was made to obtain lower extremities EMG as well as trigger point injection trials

## 2024-09-09 NOTE — REASON FOR VISIT
[Follow-Up: _____] : a [unfilled] follow-up visit [de-identified] : RIGHT carpal tunnel release [de-identified] : 9/4/2024 [FreeTextEntry1] : s/p R CTR for carpal tunnel syndrome.

## 2024-09-09 NOTE — HISTORY OF PRESENT ILLNESS
[FreeTextEntry1] : Today she reports moderate R wrist pain since the surgery which has been controlled well with medications (oxycodone) and denies fever/chills, cp, sob, dizziness, n/v, new/worsening focal neuro deficits.  [de-identified] : 58 y/o female with PMHx HTN, DM, who presented with chronic back pain for several years. Described as low back pain radiating to RLE with numbness/tingling. Pain/numbness/tingling involves the entire leg to the foot. She also reports RLE weakness. She did physical therapy for several years which initially helped with symptoms but then was no longer effective. Also tried KEVAN x 2 without relief. MRI with severe L4-S1 b/I foraminal stenosis with L4-5 spondylolisthesis. 4/12/23 pt underwent L4-5 laminectomy and fusion.   Hospital course uncomplicated and dc to [facility] 4/18/12.   5/2/23 post op: now home from rehab. Wears TLSO brace and walking with walker. Xray lumbar spine from 5/2/23 demonstrated stability s/p L4-5 fusion.  Plan made for her to RTC 1 month for f/u.   6/2/23 visit  pt returns for follow- up.  She endorses back pain has improved post op but continues to have pain in her legs that radiates posterolaterally down legs to feet with numbness/tinging, right worse than left that is the same as pre- op.  Also notes right leg weakness that is not new.  Pain worse with ambulating or prolonged periods of sitting.  Continues to weat back brace for support.  Plan was made to continue PT/meds (flexeril/lidocaine) PRN pain and repeat CT L-spine in one month with new Xray.  7/29/24 follow up visit  Returns to review new CT Lspine (7/2023 @ [facility], patient did not follow up after CT) for fusion evaluation.  Today she reports progressively worsening b/l hands numbness/pain/paresthesia causing moderate difficulty performing ADLs (grabbing objects) and persistent back to RLE (posterior/lateral down to calf) pain and b/l feet numbness worsening by prolonged sitting/standing. Currently she is getting OT with minimal relief. She reports had EMG done for her upper extremities recently which showed b/l carpal tunnel syndrome. She was aware to get lower extremities EMG as well but has not been completed. She reports moderate weakness on b/l hands (right handed) but denies balance problem , BB dysfunction, saddle anesthesia. EMG upper extremities showed R CTS and recommended CTR surgery. Tentative surgery date scheduled on 9/4/24 and plan was made to obtain lower extremities EMG as well as trigger point injection trials

## 2024-09-18 PROBLEM — Z86.79 HISTORY OF HYPERTENSION: Status: RESOLVED | Noted: 2022-11-22 | Resolved: 2024-09-18

## 2024-09-18 PROBLEM — Z86.39 HISTORY OF DIABETES MELLITUS: Status: RESOLVED | Noted: 2022-11-22 | Resolved: 2024-09-18

## 2024-09-24 ENCOUNTER — NON-APPOINTMENT (OUTPATIENT)
Age: 59
End: 2024-09-24

## 2024-09-24 ENCOUNTER — APPOINTMENT (OUTPATIENT)
Dept: NEUROSURGERY | Facility: CLINIC | Age: 59
End: 2024-09-24
Payer: COMMERCIAL

## 2024-09-24 VITALS
OXYGEN SATURATION: 98 % | TEMPERATURE: 98 F | BODY MASS INDEX: 27.75 KG/M2 | WEIGHT: 147 LBS | SYSTOLIC BLOOD PRESSURE: 154 MMHG | RESPIRATION RATE: 18 BRPM | HEIGHT: 61 IN | HEART RATE: 76 BPM | DIASTOLIC BLOOD PRESSURE: 92 MMHG

## 2024-09-24 DIAGNOSIS — T14.8XXA OTHER INJURY OF UNSPECIFIED BODY REGION, INITIAL ENCOUNTER: ICD-10-CM

## 2024-09-24 DIAGNOSIS — L08.9 OTHER INJURY OF UNSPECIFIED BODY REGION, INITIAL ENCOUNTER: ICD-10-CM

## 2024-09-24 PROCEDURE — 99024 POSTOP FOLLOW-UP VISIT: CPT

## 2024-09-24 RX ORDER — CEPHALEXIN 500 MG/1
500 CAPSULE ORAL 3 TIMES DAILY
Qty: 30 | Refills: 0 | Status: ACTIVE | COMMUNITY
Start: 2024-09-24 | End: 1900-01-01

## 2024-09-24 RX ORDER — OXYCODONE 5 MG/1
5 TABLET ORAL
Qty: 30 | Refills: 0 | Status: ACTIVE | COMMUNITY
Start: 2024-09-24 | End: 1900-01-01

## 2024-09-24 NOTE — PATIENT PROFILE ADULT - FALL HARM RISK - FACTORS NURSING JUDGEMENT
REVIEW OF CARDIOVASCULAR SYSTEMS:  Cardiovascular problem(s): Negative    Patient does not smoke.    Patient does not take aspirin.     Yes

## 2024-09-28 NOTE — HISTORY OF PRESENT ILLNESS
[de-identified] : 58 y/o female with PMHx HTN, DM, who presented with chronic back pain for several years. Described as low back pain radiating to RLE with numbness/tingling. Pain/numbness/tingling involves the entire leg to the foot. She also reports RLE weakness. She did physical therapy for several years which initially helped with symptoms but then was no longer effective. Also tried KEVAN x 2 without relief. MRI with severe L4-S1 b/I foraminal stenosis with L4-5 spondylolisthesis. 4/12/23 pt underwent L4-5 laminectomy and fusion.   Hospital course uncomplicated and dc to [facility] 4/18/12.   5/2/23 post op: now home from rehab. Wears TLSO brace and walking with walker. Xray lumbar spine from 5/2/23 demonstrated stability s/p L4-5 fusion.  Plan made for her to RTC 1 month for f/u.   6/2/23 visit  pt returns for follow- up.  She endorses back pain has improved post op but continues to have pain in her legs that radiates posterolaterally down legs to feet with numbness/tinging, right worse than left that is the same as pre- op.  Also notes right leg weakness that is not new.  Pain worse with ambulating or prolonged periods of sitting.  Continues to weat back brace for support.  Plan was made to continue PT/meds (flexeril/lidocaine) PRN pain and repeat CT L-spine in one month with new Xray.  7/29/24 follow up visit  Returns to review new CT Lspine (7/2023 @ [facility], patient did not follow up after CT) for fusion evaluation.  Today she reports progressively worsening b/l hands numbness/pain/paresthesia causing moderate difficulty performing ADLs (grabbing objects) and persistent back to RLE (posterior/lateral down to calf) pain and b/l feet numbness worsening by prolonged sitting/standing. Currently she is getting OT with minimal relief. She reports had EMG done for her upper extremities recently which showed b/l carpal tunnel syndrome. She was aware to get lower extremities EMG as well but has not been completed. She reports moderate weakness on b/l hands (right handed) but denies balance problem , BB dysfunction, saddle anesthesia. EMG upper extremities showed R CTS and recommended CTR surgery. Tentative surgery date scheduled on 9/4/24 and plan was made to obtain lower extremities EMG as well as trigger point injection trials  She underwent elective CTR. Today she reports moderate R wrist pain since the surgery which has been controlled well with medications (oxycodone) and denies fever/chills, cp, sob, dizziness, n/v, new/worsening focal neuro deficits. Plan was made to return in 2 weeks for suture removal [FreeTextEntry1] : Today she reports moderate relief of hand pain/numbness/weakness and denies fever/chills, cp, sob, dizziness, BB dysfunction, n/v or any other focal neuro deficits. Pain has been controlled well with oxycodone.

## 2024-09-28 NOTE — HISTORY OF PRESENT ILLNESS
[de-identified] : 56 y/o female with PMHx HTN, DM, who presented with chronic back pain for several years. Described as low back pain radiating to RLE with numbness/tingling. Pain/numbness/tingling involves the entire leg to the foot. She also reports RLE weakness. She did physical therapy for several years which initially helped with symptoms but then was no longer effective. Also tried KEVAN x 2 without relief. MRI with severe L4-S1 b/I foraminal stenosis with L4-5 spondylolisthesis. 4/12/23 pt underwent L4-5 laminectomy and fusion.   Hospital course uncomplicated and dc to [facility] 4/18/12.   5/2/23 post op: now home from rehab. Wears TLSO brace and walking with walker. Xray lumbar spine from 5/2/23 demonstrated stability s/p L4-5 fusion.  Plan made for her to RTC 1 month for f/u.   6/2/23 visit  pt returns for follow- up.  She endorses back pain has improved post op but continues to have pain in her legs that radiates posterolaterally down legs to feet with numbness/tinging, right worse than left that is the same as pre- op.  Also notes right leg weakness that is not new.  Pain worse with ambulating or prolonged periods of sitting.  Continues to weat back brace for support.  Plan was made to continue PT/meds (flexeril/lidocaine) PRN pain and repeat CT L-spine in one month with new Xray.  7/29/24 follow up visit  Returns to review new CT Lspine (7/2023 @ [facility], patient did not follow up after CT) for fusion evaluation.  Today she reports progressively worsening b/l hands numbness/pain/paresthesia causing moderate difficulty performing ADLs (grabbing objects) and persistent back to RLE (posterior/lateral down to calf) pain and b/l feet numbness worsening by prolonged sitting/standing. Currently she is getting OT with minimal relief. She reports had EMG done for her upper extremities recently which showed b/l carpal tunnel syndrome. She was aware to get lower extremities EMG as well but has not been completed. She reports moderate weakness on b/l hands (right handed) but denies balance problem , BB dysfunction, saddle anesthesia. EMG upper extremities showed R CTS and recommended CTR surgery. Tentative surgery date scheduled on 9/4/24 and plan was made to obtain lower extremities EMG as well as trigger point injection trials  She underwent elective CTR. Today she reports moderate R wrist pain since the surgery which has been controlled well with medications (oxycodone) and denies fever/chills, cp, sob, dizziness, n/v, new/worsening focal neuro deficits. Plan was made to return in 2 weeks for suture removal [FreeTextEntry1] : Today she reports moderate relief of hand pain/numbness/weakness and denies fever/chills, cp, sob, dizziness, BB dysfunction, n/v or any other focal neuro deficits. Pain has been controlled well with oxycodone.

## 2024-09-28 NOTE — PHYSICAL EXAM
[General Appearance - Alert] : alert [General Appearance - In No Acute Distress] : in no acute distress [] : normal voice and communication [Longitudinal] : longitudinal [Healing Well] : healing well [Purulent] : exhibiting purulent drainage [Normal Skin] : normal [Oriented To Time, Place, And Person] : oriented to person, place, and time [Impaired Insight] : insight and judgment were intact [Affect] : the affect was normal [Memory Recent] : recent memory was not impaired [Erythema] : not erythematous [Tender] : not tender [Warm] : not warm [Indurated] : not indurated [Fluctuant] : not fluctuant [FreeTextEntry1] : R wrist

## 2024-09-28 NOTE — ASSESSMENT
[FreeTextEntry1] : sutures removed today without complication. There was small amount of yellowish discharge noted otherwise unremarkable surgical incision. plan for left side carpal tunnel release in Jan 2025.  PLAN - Keflex 500mg TID 10 days for wound prophylaxis - continue oxycodone PRN pain - RTC on 10/3 to reevaluate wound   I, Dr. Kai Penny, personally performed the evaluation and management (E/M) services for this established patient who presents today with (a) new problem(s)/exacerbation of (an) existing condition(s). That E/M includes conducting the clinically appropriate interval history &/or exam, assessing all new/exacerbated conditions, and establishing a new plan of care. Today, my HARDEEP, Hyunchu Penelope-Gold, was here to observe my evaluation and management service for this new problem/exacerbated condition and follow the plan of care established by me going forward.

## 2024-09-28 NOTE — HISTORY OF PRESENT ILLNESS
[de-identified] : 56 y/o female with PMHx HTN, DM, who presented with chronic back pain for several years. Described as low back pain radiating to RLE with numbness/tingling. Pain/numbness/tingling involves the entire leg to the foot. She also reports RLE weakness. She did physical therapy for several years which initially helped with symptoms but then was no longer effective. Also tried KEVAN x 2 without relief. MRI with severe L4-S1 b/I foraminal stenosis with L4-5 spondylolisthesis. 4/12/23 pt underwent L4-5 laminectomy and fusion.   Hospital course uncomplicated and dc to [facility] 4/18/12.   5/2/23 post op: now home from rehab. Wears TLSO brace and walking with walker. Xray lumbar spine from 5/2/23 demonstrated stability s/p L4-5 fusion.  Plan made for her to RTC 1 month for f/u.   6/2/23 visit  pt returns for follow- up.  She endorses back pain has improved post op but continues to have pain in her legs that radiates posterolaterally down legs to feet with numbness/tinging, right worse than left that is the same as pre- op.  Also notes right leg weakness that is not new.  Pain worse with ambulating or prolonged periods of sitting.  Continues to weat back brace for support.  Plan was made to continue PT/meds (flexeril/lidocaine) PRN pain and repeat CT L-spine in one month with new Xray.  7/29/24 follow up visit  Returns to review new CT Lspine (7/2023 @ [facility], patient did not follow up after CT) for fusion evaluation.  Today she reports progressively worsening b/l hands numbness/pain/paresthesia causing moderate difficulty performing ADLs (grabbing objects) and persistent back to RLE (posterior/lateral down to calf) pain and b/l feet numbness worsening by prolonged sitting/standing. Currently she is getting OT with minimal relief. She reports had EMG done for her upper extremities recently which showed b/l carpal tunnel syndrome. She was aware to get lower extremities EMG as well but has not been completed. She reports moderate weakness on b/l hands (right handed) but denies balance problem , BB dysfunction, saddle anesthesia. EMG upper extremities showed R CTS and recommended CTR surgery. Tentative surgery date scheduled on 9/4/24 and plan was made to obtain lower extremities EMG as well as trigger point injection trials  She underwent elective CTR. Today she reports moderate R wrist pain since the surgery which has been controlled well with medications (oxycodone) and denies fever/chills, cp, sob, dizziness, n/v, new/worsening focal neuro deficits. Plan was made to return in 2 weeks for suture removal [FreeTextEntry1] : Today she reports moderate relief of hand pain/numbness/weakness and denies fever/chills, cp, sob, dizziness, BB dysfunction, n/v or any other focal neuro deficits. Pain has been controlled well with oxycodone.

## 2024-09-30 PROBLEM — Z86.39 HISTORY OF DIABETES MELLITUS: Status: RESOLVED | Noted: 2022-11-22 | Resolved: 2024-09-30

## 2024-09-30 PROBLEM — Z86.79 HISTORY OF HYPERTENSION: Status: RESOLVED | Noted: 2022-11-22 | Resolved: 2024-09-30

## 2024-10-03 ENCOUNTER — APPOINTMENT (OUTPATIENT)
Dept: NEUROSURGERY | Facility: CLINIC | Age: 59
End: 2024-10-03
Payer: COMMERCIAL

## 2024-10-03 VITALS
HEART RATE: 81 BPM | BODY MASS INDEX: 27.75 KG/M2 | DIASTOLIC BLOOD PRESSURE: 84 MMHG | HEIGHT: 61 IN | SYSTOLIC BLOOD PRESSURE: 130 MMHG | WEIGHT: 147 LBS | OXYGEN SATURATION: 98 % | TEMPERATURE: 98 F | RESPIRATION RATE: 18 BRPM

## 2024-10-03 DIAGNOSIS — Z86.79 PERSONAL HISTORY OF OTHER DISEASES OF THE CIRCULATORY SYSTEM: ICD-10-CM

## 2024-10-03 DIAGNOSIS — Z98.890 OTHER SPECIFIED POSTPROCEDURAL STATES: ICD-10-CM

## 2024-10-03 DIAGNOSIS — G56.00 CARPAL TUNNEL SYNDROME, UNSPECIFIED UPPER LIMB: ICD-10-CM

## 2024-10-03 DIAGNOSIS — Z86.39 PERSONAL HISTORY OF OTHER ENDOCRINE, NUTRITIONAL AND METABOLIC DISEASE: ICD-10-CM

## 2024-10-03 PROCEDURE — 99024 POSTOP FOLLOW-UP VISIT: CPT

## 2024-10-03 NOTE — PHYSICAL EXAM
[General Appearance - Alert] : alert [] : normal voice and communication [General Appearance - In No Acute Distress] : in no acute distress [Longitudinal] : longitudinal [Healing Well] : healing well [No Drainage] : without drainage [Normal Skin] : normal [Oriented To Time, Place, And Person] : oriented to person, place, and time [Impaired Insight] : insight and judgment were intact [Memory Recent] : recent memory was not impaired [Affect] : the affect was normal [FreeTextEntry1] : R wrist

## 2024-10-03 NOTE — REVIEW OF SYSTEMS
Zander Squires is calling to request a refill on the following medication(s):    Last Visit Date (If Applicable):  11/21/2023    Next Visit Date:    6/18/2024    Medication Request:  Requested Prescriptions     Pending Prescriptions Disp Refills    lisinopril (PRINIVIL;ZESTRIL) 30 MG tablet 90 tablet 1     Sig: Take 1 tablet by mouth daily                [Negative] : Integumentary

## 2024-10-03 NOTE — REASON FOR VISIT
[de-identified] : RIGHT carpal tunnel release [de-identified] : 9/4/2024 [de-identified] : to reevalute incision (Keflex 10 days for small discharge)

## 2024-10-03 NOTE — ASSESSMENT
[FreeTextEntry1] : stable post op recovery. Incision appears healing well without discharge.  PLAN - daily shower with soap - OT for hand pain - PT for lower back pain - f/u in one month to reassess symptom  I, Dr. Kai Penny, personally performed the evaluation and management (E/M) services for this established patient who presents today with (a) new problem(s)/exacerbation of (an) existing condition(s). That E/M includes conducting the clinically appropriate interval history &/or exam, assessing all new/exacerbated conditions, and establishing a new plan of care. Today, my HARDEEP, Hyunchu Penelope-Gold, was here to observe my evaluation and management service for this new problem/exacerbated condition and follow the plan of care established by me going forward.

## 2024-10-03 NOTE — REASON FOR VISIT
[de-identified] : RIGHT carpal tunnel release [de-identified] : 9/4/2024 [de-identified] : to reevalute incision (Keflex 10 days for small discharge)

## 2024-10-03 NOTE — PHYSICAL EXAM
[General Appearance - Alert] : alert [] : normal voice and communication [General Appearance - In No Acute Distress] : in no acute distress [Longitudinal] : longitudinal [Healing Well] : healing well [No Drainage] : without drainage [Normal Skin] : normal [Oriented To Time, Place, And Person] : oriented to person, place, and time [Impaired Insight] : insight and judgment were intact [Affect] : the affect was normal [Memory Recent] : recent memory was not impaired [FreeTextEntry1] : R wrist

## 2024-10-03 NOTE — HISTORY OF PRESENT ILLNESS
[de-identified] : 58 y/o female with PMHx HTN, DM, who presented with chronic back pain for several years. Described as low back pain radiating to RLE with numbness/tingling. Pain/numbness/tingling involves the entire leg to the foot. She also reports RLE weakness. She did physical therapy for several years which initially helped with symptoms but then was no longer effective. Also tried KEVAN x 2 without relief. MRI with severe L4-S1 b/I foraminal stenosis with L4-5 spondylolisthesis. 4/12/23 pt underwent L4-5 laminectomy and fusion.   Hospital course uncomplicated and dc to [facility] 4/18/12.   5/2/23 post op: now home from rehab. Wears TLSO brace and walking with walker. Xray lumbar spine from 5/2/23 demonstrated stability s/p L4-5 fusion.  Plan made for her to RTC 1 month for f/u.   6/2/23 visit  pt returns for follow- up.  She endorses back pain has improved post op but continues to have pain in her legs that radiates posterolaterally down legs to feet with numbness/tinging, right worse than left that is the same as pre- op.  Also notes right leg weakness that is not new.  Pain worse with ambulating or prolonged periods of sitting.  Continues to weat back brace for support.  Plan was made to continue PT/meds (flexeril/lidocaine) PRN pain and repeat CT L-spine in one month with new Xray.  7/29/24 follow up visit  Returns to review new CT Lspine (7/2023 @ [facility], patient did not follow up after CT) for fusion evaluation.  Today she reports progressively worsening b/l hands numbness/pain/paresthesia causing moderate difficulty performing ADLs (grabbing objects) and persistent back to RLE (posterior/lateral down to calf) pain and b/l feet numbness worsening by prolonged sitting/standing. Currently she is getting OT with minimal relief. She reports had EMG done for her upper extremities recently which showed b/l carpal tunnel syndrome. She was aware to get lower extremities EMG as well but has not been completed. She reports moderate weakness on b/l hands (right handed) but denies balance problem , BB dysfunction, saddle anesthesia. EMG upper extremities showed R CTS and recommended CTR surgery. Tentative surgery date scheduled on 9/4/24 and plan was made to obtain lower extremities EMG as well as trigger point injection trials  She underwent elective CTR. Today she reports moderate R wrist pain since the surgery which has been controlled well with medications (oxycodone) and denies fever/chills, cp, sob, dizziness, n/v, new/worsening focal neuro deficits. Plan was made to return in 2 weeks for suture removal  9/24/24 post op visit Today she reports moderate relief of hand pain/numbness/weakness and denies fever/chills, cp, sob, dizziness, BB dysfunction, n/v or any other focal neuro deficits. Pain has been controlled well with oxycodone. Sutures removed today and noticed small amount of yellowish discharge on incision. Plan was made to start Keflex for 10 days and return to clinic to reevalute incision. [FreeTextEntry1] : Today she reports moderate itchiness/pain on R hand but denies redness/swelling/discharge/fever/chills. She is taking Keflex for 10 days (last dose today) and denies any issue.

## 2024-10-03 NOTE — HISTORY OF PRESENT ILLNESS
[de-identified] : 56 y/o female with PMHx HTN, DM, who presented with chronic back pain for several years. Described as low back pain radiating to RLE with numbness/tingling. Pain/numbness/tingling involves the entire leg to the foot. She also reports RLE weakness. She did physical therapy for several years which initially helped with symptoms but then was no longer effective. Also tried KEVAN x 2 without relief. MRI with severe L4-S1 b/I foraminal stenosis with L4-5 spondylolisthesis. 4/12/23 pt underwent L4-5 laminectomy and fusion.   Hospital course uncomplicated and dc to [facility] 4/18/12.   5/2/23 post op: now home from rehab. Wears TLSO brace and walking with walker. Xray lumbar spine from 5/2/23 demonstrated stability s/p L4-5 fusion.  Plan made for her to RTC 1 month for f/u.   6/2/23 visit  pt returns for follow- up.  She endorses back pain has improved post op but continues to have pain in her legs that radiates posterolaterally down legs to feet with numbness/tinging, right worse than left that is the same as pre- op.  Also notes right leg weakness that is not new.  Pain worse with ambulating or prolonged periods of sitting.  Continues to weat back brace for support.  Plan was made to continue PT/meds (flexeril/lidocaine) PRN pain and repeat CT L-spine in one month with new Xray.  7/29/24 follow up visit  Returns to review new CT Lspine (7/2023 @ [facility], patient did not follow up after CT) for fusion evaluation.  Today she reports progressively worsening b/l hands numbness/pain/paresthesia causing moderate difficulty performing ADLs (grabbing objects) and persistent back to RLE (posterior/lateral down to calf) pain and b/l feet numbness worsening by prolonged sitting/standing. Currently she is getting OT with minimal relief. She reports had EMG done for her upper extremities recently which showed b/l carpal tunnel syndrome. She was aware to get lower extremities EMG as well but has not been completed. She reports moderate weakness on b/l hands (right handed) but denies balance problem , BB dysfunction, saddle anesthesia. EMG upper extremities showed R CTS and recommended CTR surgery. Tentative surgery date scheduled on 9/4/24 and plan was made to obtain lower extremities EMG as well as trigger point injection trials  She underwent elective CTR. Today she reports moderate R wrist pain since the surgery which has been controlled well with medications (oxycodone) and denies fever/chills, cp, sob, dizziness, n/v, new/worsening focal neuro deficits. Plan was made to return in 2 weeks for suture removal  9/24/24 post op visit Today she reports moderate relief of hand pain/numbness/weakness and denies fever/chills, cp, sob, dizziness, BB dysfunction, n/v or any other focal neuro deficits. Pain has been controlled well with oxycodone. Sutures removed today and noticed small amount of yellowish discharge on incision. Plan was made to start Keflex for 10 days and return to clinic to reevalute incision. [FreeTextEntry1] : Today she reports moderate itchiness/pain on R hand but denies redness/swelling/discharge/fever/chills. She is taking Keflex for 10 days (last dose today) and denies any issue.

## 2024-10-24 PROBLEM — Z86.79 HISTORY OF HYPERTENSION: Status: RESOLVED | Noted: 2022-11-22 | Resolved: 2024-10-24

## 2024-10-24 PROBLEM — Z86.39 HISTORY OF DIABETES MELLITUS: Status: RESOLVED | Noted: 2022-11-22 | Resolved: 2024-10-24

## 2024-11-01 ENCOUNTER — APPOINTMENT (OUTPATIENT)
Dept: NEUROSURGERY | Facility: CLINIC | Age: 59
End: 2024-11-01

## 2024-11-01 VITALS
RESPIRATION RATE: 18 BRPM | BODY MASS INDEX: 27.75 KG/M2 | HEIGHT: 61 IN | WEIGHT: 147 LBS | DIASTOLIC BLOOD PRESSURE: 75 MMHG | HEART RATE: 80 BPM | OXYGEN SATURATION: 98 % | TEMPERATURE: 98.1 F | SYSTOLIC BLOOD PRESSURE: 108 MMHG

## 2024-11-01 DIAGNOSIS — G56.00 CARPAL TUNNEL SYNDROME, UNSPECIFIED UPPER LIMB: ICD-10-CM

## 2024-11-01 DIAGNOSIS — M79.646 PAIN IN UNSPECIFIED FINGER(S): ICD-10-CM

## 2024-11-01 DIAGNOSIS — Z86.39 PERSONAL HISTORY OF OTHER ENDOCRINE, NUTRITIONAL AND METABOLIC DISEASE: ICD-10-CM

## 2024-11-01 DIAGNOSIS — Z86.79 PERSONAL HISTORY OF OTHER DISEASES OF THE CIRCULATORY SYSTEM: ICD-10-CM

## 2024-11-01 DIAGNOSIS — G89.29 LOW BACK PAIN, UNSPECIFIED: ICD-10-CM

## 2024-11-01 DIAGNOSIS — Z98.890 OTHER SPECIFIED POSTPROCEDURAL STATES: ICD-10-CM

## 2024-11-01 DIAGNOSIS — M47.816 SPONDYLOSIS W/OUT MYELOPATHY OR RADICULOPATHY, LUMBAR REGION: ICD-10-CM

## 2024-11-01 DIAGNOSIS — M54.50 LOW BACK PAIN, UNSPECIFIED: ICD-10-CM

## 2024-11-01 PROCEDURE — 99024 POSTOP FOLLOW-UP VISIT: CPT

## 2024-11-01 RX ORDER — IBUPROFEN 600 MG/1
600 TABLET, FILM COATED ORAL 3 TIMES DAILY
Qty: 90 | Refills: 1 | Status: ACTIVE | COMMUNITY
Start: 2024-11-01 | End: 1900-01-01

## 2024-11-18 ENCOUNTER — RX RENEWAL (OUTPATIENT)
Age: 59
End: 2024-11-18

## 2025-01-07 PROBLEM — Z86.79 HISTORY OF HYPERTENSION: Status: RESOLVED | Noted: 2022-11-22 | Resolved: 2025-01-07

## 2025-01-07 PROBLEM — Z86.39 HISTORY OF DIABETES MELLITUS: Status: RESOLVED | Noted: 2022-11-22 | Resolved: 2025-01-07

## 2025-01-10 ENCOUNTER — APPOINTMENT (OUTPATIENT)
Dept: NEUROSURGERY | Facility: CLINIC | Age: 60
End: 2025-01-10
Payer: COMMERCIAL

## 2025-01-10 DIAGNOSIS — Z86.39 PERSONAL HISTORY OF OTHER ENDOCRINE, NUTRITIONAL AND METABOLIC DISEASE: ICD-10-CM

## 2025-01-10 DIAGNOSIS — Z86.79 PERSONAL HISTORY OF OTHER DISEASES OF THE CIRCULATORY SYSTEM: ICD-10-CM

## 2025-01-10 DIAGNOSIS — Z98.890 OTHER SPECIFIED POSTPROCEDURAL STATES: ICD-10-CM

## 2025-01-10 DIAGNOSIS — G56.00 CARPAL TUNNEL SYNDROME, UNSPECIFIED UPPER LIMB: ICD-10-CM

## 2025-01-10 PROCEDURE — 99215 OFFICE O/P EST HI 40 MIN: CPT

## 2025-01-10 RX ORDER — GABAPENTIN 100 MG/1
100 CAPSULE ORAL TWICE DAILY
Qty: 60 | Refills: 4 | Status: ACTIVE | COMMUNITY
Start: 2025-01-10 | End: 1900-01-01

## 2025-02-05 NOTE — END OF VISIT
[Time Spent: ___ minutes] : I have spent [unfilled] minutes of time on the encounter. [Discuss Alternatives/Risks/Benefits w/Patient] : All alternatives, risks, and benefits were discussed with the patient/family and all questions were answered.  Patient expressed good understanding and appreciates the importance of follow up as recommended. [Reviewed Radiology Report(s)] : Radiology reports were reviewed. [Reviewed Clinical Lab Test(s)] : Results of clinical tests were reviewed. [FreeTextEntry1] : Patient requesting Robotic TLHBSO\par genetic testing as paternal history concerning for HNPCC [Pre Op] : The differential diagnosis was discussed in detail. The indications, risks, benefits and alternatives were discussed. [unfilled] expressed an understanding of the treatment rationale and her questions were answered to her apparent satisfaction. [FreeTextEntry2] : extensive paternal family history of ovary and kidney cancer Statement Selected

## 2025-05-08 NOTE — ASU PATIENT PROFILE, ADULT - MEDICATION HERBAL REMEDIES, PROFILE
May 8, 2025      [facility]  [address] [address]  Phone: [phone]  Fax: [phone]       Patient: Alexy Alvarez   YOB: 2008  Date of Visit: 05/08/2025    To Whom It May Concern:    Bernard Alvarez  was at [facility] on 05/08/2025. The patient may return to work/school on 05/08/2025 with no restrictions. If you have any questions or concerns, or if I can be of further assistance, please do not hesitate to contact me.    Sincerely,    Kimberley Jones MA      no

## 2025-06-11 ENCOUNTER — RX RENEWAL (OUTPATIENT)
Age: 60
End: 2025-06-11
